# Patient Record
Sex: MALE | Race: WHITE | Employment: FULL TIME | URBAN - NONMETROPOLITAN AREA
[De-identification: names, ages, dates, MRNs, and addresses within clinical notes are randomized per-mention and may not be internally consistent; named-entity substitution may affect disease eponyms.]

---

## 2021-10-13 LAB — SARS-COV-2: DETECTED

## 2021-10-14 ENCOUNTER — HOSPITAL ENCOUNTER (EMERGENCY)
Age: 61
Discharge: HOME | End: 2021-10-14
Payer: COMMERCIAL

## 2021-10-14 ENCOUNTER — APPOINTMENT (OUTPATIENT)
Dept: CT IMAGING | Age: 61
End: 2021-10-14
Payer: COMMERCIAL

## 2021-10-14 VITALS
SYSTOLIC BLOOD PRESSURE: 109 MMHG | WEIGHT: 175 LBS | RESPIRATION RATE: 16 BRPM | OXYGEN SATURATION: 99 % | HEART RATE: 80 BPM | TEMPERATURE: 100.6 F | DIASTOLIC BLOOD PRESSURE: 89 MMHG

## 2021-10-14 DIAGNOSIS — J12.82 PNEUMONIA DUE TO COVID-19 VIRUS: Primary | ICD-10-CM

## 2021-10-14 DIAGNOSIS — U07.1 PNEUMONIA DUE TO COVID-19 VIRUS: Primary | ICD-10-CM

## 2021-10-14 DIAGNOSIS — R77.8 ELEVATED TROPONIN: ICD-10-CM

## 2021-10-14 DIAGNOSIS — R59.1 LYMPHADENOPATHY: ICD-10-CM

## 2021-10-14 LAB
ANION GAP SERPL CALCULATED.3IONS-SCNC: 10 MEQ/L (ref 8–16)
BASOPHILS # BLD: 0.2 %
BASOPHILS ABSOLUTE: 0 THOU/MM3 (ref 0–0.1)
BUN BLDV-MCNC: 21 MG/DL (ref 7–22)
C-REACTIVE PROTEIN: 6.9 MG/DL (ref 0–1)
CALCIUM SERPL-MCNC: 8.7 MG/DL (ref 8.5–10.5)
CHLORIDE BLD-SCNC: 98 MEQ/L (ref 98–111)
CO2: 25 MEQ/L (ref 23–33)
CREAT SERPL-MCNC: 1.3 MG/DL (ref 0.4–1.2)
EKG ATRIAL RATE: 84 BPM
EKG P AXIS: 65 DEGREES
EKG P-R INTERVAL: 156 MS
EKG Q-T INTERVAL: 376 MS
EKG QRS DURATION: 114 MS
EKG QTC CALCULATION (BAZETT): 444 MS
EKG R AXIS: 61 DEGREES
EKG T AXIS: 61 DEGREES
EKG VENTRICULAR RATE: 84 BPM
EOSINOPHIL # BLD: 0 %
EOSINOPHILS ABSOLUTE: 0 THOU/MM3 (ref 0–0.4)
ERYTHROCYTE [DISTWIDTH] IN BLOOD BY AUTOMATED COUNT: 12.6 % (ref 11.5–14.5)
ERYTHROCYTE [DISTWIDTH] IN BLOOD BY AUTOMATED COUNT: 44.7 FL (ref 35–45)
GFR SERPL CREATININE-BSD FRML MDRD: 56 ML/MIN/1.73M2
GLUCOSE BLD-MCNC: 97 MG/DL (ref 70–108)
HCT VFR BLD CALC: 45 % (ref 42–52)
HEMOGLOBIN: 14.9 GM/DL (ref 14–18)
IMMATURE GRANS (ABS): 0.02 THOU/MM3 (ref 0–0.07)
IMMATURE GRANULOCYTES: 0.5 %
LACTIC ACID, SEPSIS: < 0.2 MMOL/L (ref 0.5–1.9)
LD: 216 U/L (ref 100–190)
LYMPHOCYTES # BLD: 7.1 %
LYMPHOCYTES ABSOLUTE: 0.3 THOU/MM3 (ref 1–4.8)
MAGNESIUM: 2 MG/DL (ref 1.6–2.4)
MCH RBC QN AUTO: 31.7 PG (ref 26–33)
MCHC RBC AUTO-ENTMCNC: 33.1 GM/DL (ref 32.2–35.5)
MCV RBC AUTO: 95.7 FL (ref 80–94)
MONOCYTES # BLD: 12.4 %
MONOCYTES ABSOLUTE: 0.5 THOU/MM3 (ref 0.4–1.3)
NUCLEATED RED BLOOD CELLS: 0 /100 WBC
OSMOLALITY CALCULATION: 269.3 MOSMOL/KG (ref 275–300)
PLATELET # BLD: 139 THOU/MM3 (ref 130–400)
PMV BLD AUTO: 9.1 FL (ref 9.4–12.4)
POTASSIUM SERPL-SCNC: 4.4 MEQ/L (ref 3.5–5.2)
PRO-BNP: 82.2 PG/ML (ref 0–900)
RBC # BLD: 4.7 MILL/MM3 (ref 4.7–6.1)
SEG NEUTROPHILS: 79.8 %
SEGMENTED NEUTROPHILS ABSOLUTE COUNT: 3.3 THOU/MM3 (ref 1.8–7.7)
SODIUM BLD-SCNC: 133 MEQ/L (ref 135–145)
TRANSFERRIN: 175 MG/DL (ref 200–360)
TROPONIN T: 0.01 NG/ML
TROPONIN T: 0.02 NG/ML
WBC # BLD: 4.1 THOU/MM3 (ref 4.8–10.8)

## 2021-10-14 PROCEDURE — 84484 ASSAY OF TROPONIN QUANT: CPT

## 2021-10-14 PROCEDURE — 83735 ASSAY OF MAGNESIUM: CPT

## 2021-10-14 PROCEDURE — 93010 ELECTROCARDIOGRAM REPORT: CPT | Performed by: INTERNAL MEDICINE

## 2021-10-14 PROCEDURE — 71275 CT ANGIOGRAPHY CHEST: CPT

## 2021-10-14 PROCEDURE — 83615 LACTATE (LD) (LDH) ENZYME: CPT

## 2021-10-14 PROCEDURE — 85025 COMPLETE CBC W/AUTO DIFF WBC: CPT

## 2021-10-14 PROCEDURE — 93005 ELECTROCARDIOGRAM TRACING: CPT | Performed by: PHYSICIAN ASSISTANT

## 2021-10-14 PROCEDURE — 36415 COLL VENOUS BLD VENIPUNCTURE: CPT

## 2021-10-14 PROCEDURE — 86140 C-REACTIVE PROTEIN: CPT

## 2021-10-14 PROCEDURE — 84466 ASSAY OF TRANSFERRIN: CPT

## 2021-10-14 PROCEDURE — 6360000004 HC RX CONTRAST MEDICATION: Performed by: PHYSICIAN ASSISTANT

## 2021-10-14 PROCEDURE — 83605 ASSAY OF LACTIC ACID: CPT

## 2021-10-14 PROCEDURE — 83880 ASSAY OF NATRIURETIC PEPTIDE: CPT

## 2021-10-14 PROCEDURE — 80048 BASIC METABOLIC PNL TOTAL CA: CPT

## 2021-10-14 PROCEDURE — 99285 EMERGENCY DEPT VISIT HI MDM: CPT

## 2021-10-14 RX ORDER — GABAPENTIN 600 MG/1
600 TABLET ORAL 2 TIMES DAILY
COMMUNITY

## 2021-10-14 RX ADMIN — IOPAMIDOL 80 ML: 755 INJECTION, SOLUTION INTRAVENOUS at 13:04

## 2021-10-14 ASSESSMENT — ENCOUNTER SYMPTOMS
RHINORRHEA: 1
SINUS PRESSURE: 0
CONSTIPATION: 0
COUGH: 1
BACK PAIN: 0
CHEST TIGHTNESS: 1
EYE REDNESS: 0
TROUBLE SWALLOWING: 0
SINUS PAIN: 0
SORE THROAT: 0
EYE ITCHING: 0
ABDOMINAL PAIN: 0
VOMITING: 0
COLOR CHANGE: 0
EYE PAIN: 0
SHORTNESS OF BREATH: 0
WHEEZING: 0
NAUSEA: 0
DIARRHEA: 0

## 2021-10-14 ASSESSMENT — PAIN SCALES - GENERAL: PAINLEVEL_OUTOF10: 3

## 2021-10-14 ASSESSMENT — PAIN DESCRIPTION - DESCRIPTORS: DESCRIPTORS: SORE

## 2021-10-14 ASSESSMENT — PAIN DESCRIPTION - FREQUENCY: FREQUENCY: CONTINUOUS

## 2021-10-14 ASSESSMENT — PAIN DESCRIPTION - LOCATION: LOCATION: CHEST

## 2021-10-14 ASSESSMENT — PAIN DESCRIPTION - PAIN TYPE: TYPE: ACUTE PAIN

## 2021-10-14 NOTE — ED PROVIDER NOTES
back pain, myalgias and neck pain. Skin: Negative for color change, pallor, rash and wound. Allergic/Immunologic: Negative for environmental allergies, food allergies and immunocompromised state. Neurological: Negative for dizziness, weakness, light-headedness, numbness and headaches. Hematological: Negative for adenopathy. Does not bruise/bleed easily. Psychiatric/Behavioral: Negative for agitation, behavioral problems, decreased concentration and dysphoric mood. The patient is not nervous/anxious. PAST MEDICAL HISTORY    has a past medical history of Nerve damage. SURGICAL HISTORY      has a past surgical history that includes hernia repair. CURRENT MEDICATIONS       Discharge Medication List as of 10/14/2021  4:40 PM      CONTINUE these medications which have NOT CHANGED    Details   GABAPENTIN PO Take by mouthHistorical Med             ALLERGIES     has No Known Allergies. HISTORY     has no family status information on file. family history is not on file. SOCIALHISTORY      reports that he has never smoked. His smokeless tobacco use includes chew. He reports current alcohol use. He reports that he does not use drugs. PHYSICAL EXAM     INITIAL VITALS:  weight is 175 lb (79.4 kg). His oral temperature is 100.6 °F (38.1 °C). His blood pressure is 109/89 and his pulse is 80. His respiration is 16 and oxygen saturation is 99%. Physical Exam  Constitutional:       General: He is not in acute distress. Appearance: Normal appearance. He is normal weight. He is not ill-appearing, toxic-appearing or diaphoretic. HENT:      Head: Normocephalic and atraumatic. Nose: Nose normal. No congestion or rhinorrhea. Mouth/Throat:      Mouth: Mucous membranes are moist.   Eyes:      General: No scleral icterus. Right eye: No discharge. Left eye: No discharge. Extraocular Movements: Extraocular movements intact.       Conjunctiva/sclera: Conjunctivae normal. 10/14/21 10:52:41 10/14/21 11:02:11 444 65 61 61         Preliminary result                Narrative:    Normal sinus rhythm   Possible Left atrial enlargement   Incomplete right bundle branch block   Borderline ECG   No previous ECGs available                Preliminary result                Narrative:    Normal sinus rhythm   Possible Left atrial enlargement   Incomplete right bundle branch block   Borderline ECG   No previous ECGs available                      RADIOLOGY: non-plain film images(s) such as CT, Ultrasound and MRI are read by the radiologist.  CT results per radiology reads      CTA CHEST W WO CONTRAST (Final result)  Result time 10/14/21 13:21:49  Final result by Nayeli Malone MD (10/14/21 13:21:49)                Impression:    1. No filling defects are noted within the pulmonary arterial vasculature to suggest the presence of pulmonary embolism as clinically questioned. 2. Patchy multifocal bilateral lower lobe airspace opacities are consistent with the clinical history of covid pneumonia. A prominent right hilar lymph node measuring 13 mm in short axis may be reactive. A 3 month follow-up CT thorax may be obtained at   the completion of medical treatment to ensure resolution. **This report has been created using voice recognition software.  It may contain minor errors which are inherent in voice recognition technology. **     Final report electronically signed by Dr Nayeli Malone on 10/14/2021 1:21 PM            Narrative:    PROCEDURE: CTA CHEST W WO CONTRAST     CLINICAL INFORMATION: Chest pain. History of covid pneumonia. COMPARISON: No prior study. TECHNIQUE:   1.5 mm axial images were obtained through the chest after the administration of IV contrast. A non-contrast localizer was obtained. 2mm MIP reconstructions of the chest performed in coronal and sagittal planes.      All CT scans at this facility use dose modulation, iterative reconstruction, and/or weight based components:     (*)     All other components within normal limits   GLOMERULAR FILTRATION RATE, ESTIMATED - Abnormal; Notable for the following components:    Est, Glom Filt Rate 56 (*)     All other components within normal limits   OSMOLALITY - Abnormal; Notable for the following components:    Osmolality Calc 269.3 (*)     All other components within normal limits   TROPONIN - Abnormal; Notable for the following components:    Troponin T 0.011 (*)     All other components within normal limits   BRAIN NATRIURETIC PEPTIDE   MAGNESIUM   ANION GAP   TRANSFERRIN       EMERGENCY DEPARTMENT COURSE:   :    Vitals:    10/14/21 1244 10/14/21 1351 10/14/21 1510 10/14/21 1634   BP:  109/89     Pulse:  80 80 80   Resp:  13 14 16   Temp:       TempSrc:       SpO2: 94% 97% 97% 99%   Weight:         Patient was seen history physical exam was performed. I did explain the patient that he did have some enlarged lymph nodes likely secondary to inflammation from his Covid however it was recommended this for repeated in 3 months. I also did talk about his elevated troponin that did trend down. This could be from dehydration however my attending physician felt an outpatient cardiology referral was warranted. Case was staffed with Dr Narcisa Santos. see disposition below    CRITICAL CARE:  None    CONSULTS:  None    PROCEDURES:  None    FINAL IMPRESSION      1. Pneumonia due to COVID-19 virus    2. Elevated troponin    3.  Lymphadenopathy          DISPOSITION/PLAN   Discharge    PATIENT REFERRED TO:  Danya Coello MD  [address] [place] [address]  [phone]          [facility] [address] [place] [address]  [phone]  In 2 days        DISCHARGE MEDICATIONS:  Discharge Medication List as of 10/14/2021  4:40 PM          (Please note that portions of this note were completed with a voice recognitionprogram.  Efforts were made to edit the dictations but occasionally words are mis-transcribed.)    NANI [place], [place]  10/14/21 1912

## 2021-10-14 NOTE — ED TRIAGE NOTES
Patient presents to the ED with concerns of low pulse ox at home. Patient states he recently tested positive for covid. He was advised by his PCP to check his pulse ox a few times a day and if results were less than 95%, to come in to be evaluated. He states he was sitting at home drinking his coffee and found his pulse ox to be down to 90%. Patient denies sob. He has chest tightness with breathing that he rates as a soreness of 3/10. VSS. Current pulse ox at 97-99%.

## 2021-10-15 ENCOUNTER — CARE COORDINATION (OUTPATIENT)
Dept: CARE COORDINATION | Age: 61
End: 2021-10-15

## 2021-10-15 NOTE — CARE COORDINATION
Patient contacted regarding recent visit for viral symptoms. Call within 2 business days of discharge: Yes     contacted the patient by telephone to perform follow-up call. Verified name and  with patient as identifiers. Provided introduction to self, and reason for call due to viral symptoms of infection and/or exposure to COVID-19. Discussed COVID-19 related testing which was available at this time. Test results were positive. Patient informed of results, if available? Yes. Patient presented to emergency department/flu clinic with complaints of viral symptoms/exposure to COVID. Patient reports symptoms are improving. Due to no new or worsening symptoms the RN CTN/ANIRUDH was not notified for escalation. This author reviewed discharge instructions, medical action plan and red flags such as increased shortness of breath, increasing fever, worsening cough or chest pain with patient who verbalized understanding. Discussed exposure protocols and quarantine with CDC Guidelines What To Do If You Are Sick    Patient was given an opportunity for questions and concerns. The patient agrees to contact their healthcare provider for questions related to their healthcare. Author provided contact information for future reference. Patient was seen in the ER on 10/14/21 he is positive for covid and went in because his pulse ox was 95. He was told by his PCP if it is 95 or below to go get checked out. I educated the patient on covid precautions and gave him the covid hotline number. Patient states his breathing is normal today and he is feeling good. Patient aware to call the hotline or his pcp with new or worsening symptoms.     [facility]   Medication Assistance  [facility], and [place]    Z) [phone] (v) [phone]

## 2021-10-23 ENCOUNTER — HOSPITAL ENCOUNTER (INPATIENT)
Age: 61
LOS: 2 days | Discharge: HOME | DRG: 177 | End: 2021-10-25
Attending: EMERGENCY MEDICINE | Admitting: INTERNAL MEDICINE
Payer: COMMERCIAL

## 2021-10-23 ENCOUNTER — APPOINTMENT (OUTPATIENT)
Dept: GENERAL RADIOLOGY | Age: 61
DRG: 177 | End: 2021-10-23
Payer: COMMERCIAL

## 2021-10-23 DIAGNOSIS — E87.5 HYPERKALEMIA: ICD-10-CM

## 2021-10-23 DIAGNOSIS — U07.1 COVID: Primary | ICD-10-CM

## 2021-10-23 DIAGNOSIS — R09.02 HYPOXIA: ICD-10-CM

## 2021-10-23 PROBLEM — J96.01 ACUTE HYPOXEMIC RESPIRATORY FAILURE DUE TO COVID-19 (HCC): Status: ACTIVE | Noted: 2021-10-23

## 2021-10-23 LAB
ALBUMIN SERPL-MCNC: 3.1 G/DL (ref 3.5–5.1)
ALP BLD-CCNC: 122 U/L (ref 38–126)
ALT SERPL-CCNC: 17 U/L (ref 11–66)
ANION GAP SERPL CALCULATED.3IONS-SCNC: 10 MEQ/L (ref 8–16)
AST SERPL-CCNC: 23 U/L (ref 5–40)
BASOPHILS # BLD: 0.5 %
BASOPHILS ABSOLUTE: 0 THOU/MM3 (ref 0–0.1)
BILIRUB SERPL-MCNC: 0.8 MG/DL (ref 0.3–1.2)
BUN BLDV-MCNC: 17 MG/DL (ref 7–22)
C-REACTIVE PROTEIN: 16.3 MG/DL (ref 0–1)
CALCIUM SERPL-MCNC: 8.9 MG/DL (ref 8.5–10.5)
CHLORIDE BLD-SCNC: 96 MEQ/L (ref 98–111)
CO2: 25 MEQ/L (ref 23–33)
CREAT SERPL-MCNC: 1 MG/DL (ref 0.4–1.2)
EOSINOPHIL # BLD: 0.2 %
EOSINOPHILS ABSOLUTE: 0 THOU/MM3 (ref 0–0.4)
ERYTHROCYTE [DISTWIDTH] IN BLOOD BY AUTOMATED COUNT: 12.8 % (ref 11.5–14.5)
ERYTHROCYTE [DISTWIDTH] IN BLOOD BY AUTOMATED COUNT: 43.9 FL (ref 35–45)
FERRITIN: 3201 NG/ML (ref 22–322)
GFR SERPL CREATININE-BSD FRML MDRD: 75 ML/MIN/1.73M2
GLUCOSE BLD-MCNC: 108 MG/DL (ref 70–108)
HCT VFR BLD CALC: 44.1 % (ref 42–52)
HEMOGLOBIN: 14.8 GM/DL (ref 14–18)
IMMATURE GRANS (ABS): 0.3 THOU/MM3 (ref 0–0.07)
IMMATURE GRANULOCYTES: 4.7 %
LACTIC ACID: 1.8 MMOL/L (ref 0.5–2)
LD: 336 U/L (ref 100–190)
LYMPHOCYTES # BLD: 5 %
LYMPHOCYTES ABSOLUTE: 0.3 THOU/MM3 (ref 1–4.8)
MCH RBC QN AUTO: 31.6 PG (ref 26–33)
MCHC RBC AUTO-ENTMCNC: 33.6 GM/DL (ref 32.2–35.5)
MCV RBC AUTO: 94 FL (ref 80–94)
MONOCYTES # BLD: 5.9 %
MONOCYTES ABSOLUTE: 0.4 THOU/MM3 (ref 0.4–1.3)
NUCLEATED RED BLOOD CELLS: 0 /100 WBC
OSMOLALITY CALCULATION: 264.7 MOSMOL/KG (ref 275–300)
PLATELET # BLD: 313 THOU/MM3 (ref 130–400)
PMV BLD AUTO: 9.2 FL (ref 9.4–12.4)
POTASSIUM REFLEX MAGNESIUM: 4.2 MEQ/L (ref 3.5–5.2)
RBC # BLD: 4.69 MILL/MM3 (ref 4.7–6.1)
SEG NEUTROPHILS: 83.7 %
SEGMENTED NEUTROPHILS ABSOLUTE COUNT: 5.4 THOU/MM3 (ref 1.8–7.7)
SODIUM BLD-SCNC: 131 MEQ/L (ref 135–145)
TOTAL PROTEIN: 6.9 G/DL (ref 6.1–8)
TROPONIN T: 0.01 NG/ML
WBC # BLD: 6.4 THOU/MM3 (ref 4.8–10.8)

## 2021-10-23 PROCEDURE — 80053 COMPREHEN METABOLIC PANEL: CPT

## 2021-10-23 PROCEDURE — 6370000000 HC RX 637 (ALT 250 FOR IP): Performed by: INTERNAL MEDICINE

## 2021-10-23 PROCEDURE — 2580000003 HC RX 258: Performed by: INTERNAL MEDICINE

## 2021-10-23 PROCEDURE — 93005 ELECTROCARDIOGRAM TRACING: CPT | Performed by: EMERGENCY MEDICINE

## 2021-10-23 PROCEDURE — 82728 ASSAY OF FERRITIN: CPT

## 2021-10-23 PROCEDURE — 99223 1ST HOSP IP/OBS HIGH 75: CPT | Performed by: INTERNAL MEDICINE

## 2021-10-23 PROCEDURE — 6360000002 HC RX W HCPCS: Performed by: EMERGENCY MEDICINE

## 2021-10-23 PROCEDURE — 86140 C-REACTIVE PROTEIN: CPT

## 2021-10-23 PROCEDURE — 83605 ASSAY OF LACTIC ACID: CPT

## 2021-10-23 PROCEDURE — 36415 COLL VENOUS BLD VENIPUNCTURE: CPT

## 2021-10-23 PROCEDURE — 99284 EMERGENCY DEPT VISIT MOD MDM: CPT

## 2021-10-23 PROCEDURE — 6360000002 HC RX W HCPCS: Performed by: INTERNAL MEDICINE

## 2021-10-23 PROCEDURE — 83615 LACTATE (LD) (LDH) ENZYME: CPT

## 2021-10-23 PROCEDURE — 84484 ASSAY OF TROPONIN QUANT: CPT

## 2021-10-23 PROCEDURE — 71045 X-RAY EXAM CHEST 1 VIEW: CPT

## 2021-10-23 PROCEDURE — 96374 THER/PROPH/DIAG INJ IV PUSH: CPT

## 2021-10-23 PROCEDURE — 85025 COMPLETE CBC W/AUTO DIFF WBC: CPT

## 2021-10-23 PROCEDURE — 1200000000 HC SEMI PRIVATE

## 2021-10-23 PROCEDURE — XW0DXM6 INTRODUCTION OF BARICITINIB INTO MOUTH AND PHARYNX, EXTERNAL APPROACH, NEW TECHNOLOGY GROUP 6: ICD-10-PCS | Performed by: INTERNAL MEDICINE

## 2021-10-23 RX ORDER — SODIUM CHLORIDE 9 MG/ML
25 INJECTION, SOLUTION INTRAVENOUS PRN
Status: DISCONTINUED | OUTPATIENT
Start: 2021-10-23 | End: 2021-10-25 | Stop reason: HOSPADM

## 2021-10-23 RX ORDER — POLYETHYLENE GLYCOL 3350 17 G/17G
17 POWDER, FOR SOLUTION ORAL DAILY PRN
Status: DISCONTINUED | OUTPATIENT
Start: 2021-10-23 | End: 2021-10-25 | Stop reason: HOSPADM

## 2021-10-23 RX ORDER — ZINC SULFATE 50(220)MG
50 CAPSULE ORAL DAILY
Status: DISCONTINUED | OUTPATIENT
Start: 2021-10-23 | End: 2021-10-25 | Stop reason: HOSPADM

## 2021-10-23 RX ORDER — GUAIFENESIN/DEXTROMETHORPHAN 100-10MG/5
5 SYRUP ORAL EVERY 4 HOURS PRN
Status: DISCONTINUED | OUTPATIENT
Start: 2021-10-23 | End: 2021-10-25 | Stop reason: HOSPADM

## 2021-10-23 RX ORDER — ASCORBIC ACID 500 MG
1000 TABLET ORAL DAILY
Status: DISCONTINUED | OUTPATIENT
Start: 2021-10-23 | End: 2021-10-25 | Stop reason: HOSPADM

## 2021-10-23 RX ORDER — DEXAMETHASONE SODIUM PHOSPHATE 4 MG/ML
6 INJECTION, SOLUTION INTRA-ARTICULAR; INTRALESIONAL; INTRAMUSCULAR; INTRAVENOUS; SOFT TISSUE EVERY 24 HOURS
Status: DISCONTINUED | OUTPATIENT
Start: 2021-10-23 | End: 2021-10-25 | Stop reason: HOSPADM

## 2021-10-23 RX ORDER — SODIUM CHLORIDE 9 MG/ML
INJECTION, SOLUTION INTRAVENOUS CONTINUOUS
Status: ACTIVE | OUTPATIENT
Start: 2021-10-23 | End: 2021-10-24

## 2021-10-23 RX ORDER — ACETAMINOPHEN 650 MG/1
650 SUPPOSITORY RECTAL EVERY 6 HOURS PRN
Status: DISCONTINUED | OUTPATIENT
Start: 2021-10-23 | End: 2021-10-25 | Stop reason: HOSPADM

## 2021-10-23 RX ORDER — SODIUM CHLORIDE 0.9 % (FLUSH) 0.9 %
5-40 SYRINGE (ML) INJECTION EVERY 12 HOURS SCHEDULED
Status: DISCONTINUED | OUTPATIENT
Start: 2021-10-23 | End: 2021-10-25 | Stop reason: HOSPADM

## 2021-10-23 RX ORDER — ONDANSETRON 2 MG/ML
4 INJECTION INTRAMUSCULAR; INTRAVENOUS EVERY 6 HOURS PRN
Status: DISCONTINUED | OUTPATIENT
Start: 2021-10-23 | End: 2021-10-25 | Stop reason: HOSPADM

## 2021-10-23 RX ORDER — VITAMIN B COMPLEX
2000 TABLET ORAL DAILY
Status: DISCONTINUED | OUTPATIENT
Start: 2021-10-23 | End: 2021-10-25 | Stop reason: HOSPADM

## 2021-10-23 RX ORDER — GABAPENTIN 600 MG/1
600 TABLET ORAL 3 TIMES DAILY
Status: DISCONTINUED | OUTPATIENT
Start: 2021-10-23 | End: 2021-10-25 | Stop reason: HOSPADM

## 2021-10-23 RX ORDER — ACETAMINOPHEN 325 MG/1
650 TABLET ORAL EVERY 6 HOURS PRN
Status: DISCONTINUED | OUTPATIENT
Start: 2021-10-23 | End: 2021-10-25 | Stop reason: HOSPADM

## 2021-10-23 RX ORDER — ONDANSETRON 4 MG/1
4 TABLET, ORALLY DISINTEGRATING ORAL EVERY 8 HOURS PRN
Status: DISCONTINUED | OUTPATIENT
Start: 2021-10-23 | End: 2021-10-25 | Stop reason: HOSPADM

## 2021-10-23 RX ORDER — SODIUM CHLORIDE 0.9 % (FLUSH) 0.9 %
5-40 SYRINGE (ML) INJECTION PRN
Status: DISCONTINUED | OUTPATIENT
Start: 2021-10-23 | End: 2021-10-25 | Stop reason: HOSPADM

## 2021-10-23 RX ADMIN — Medication 50 MG: at 20:04

## 2021-10-23 RX ADMIN — SODIUM CHLORIDE, PRESERVATIVE FREE 10 ML: 5 INJECTION INTRAVENOUS at 13:56

## 2021-10-23 RX ADMIN — OXYCODONE HYDROCHLORIDE AND ACETAMINOPHEN 1000 MG: 500 TABLET ORAL at 20:04

## 2021-10-23 RX ADMIN — GABAPENTIN 600 MG: 600 TABLET, FILM COATED ORAL at 13:54

## 2021-10-23 RX ADMIN — Medication 2000 UNITS: at 13:54

## 2021-10-23 RX ADMIN — BARICITINIB 4 MG: 2 TABLET, FILM COATED ORAL at 13:54

## 2021-10-23 RX ADMIN — GABAPENTIN 600 MG: 600 TABLET, FILM COATED ORAL at 20:06

## 2021-10-23 RX ADMIN — DEXAMETHASONE SODIUM PHOSPHATE 6 MG: 4 INJECTION, SOLUTION INTRA-ARTICULAR; INTRALESIONAL; INTRAMUSCULAR; INTRAVENOUS; SOFT TISSUE at 09:24

## 2021-10-23 RX ADMIN — SODIUM CHLORIDE: 9 INJECTION, SOLUTION INTRAVENOUS at 18:49

## 2021-10-23 RX ADMIN — ENOXAPARIN SODIUM 30 MG: 100 INJECTION SUBCUTANEOUS at 20:04

## 2021-10-23 RX ADMIN — ENOXAPARIN SODIUM 30 MG: 100 INJECTION SUBCUTANEOUS at 13:54

## 2021-10-23 ASSESSMENT — PAIN DESCRIPTION - PAIN TYPE: TYPE: ACUTE PAIN

## 2021-10-23 ASSESSMENT — ENCOUNTER SYMPTOMS
BACK PAIN: 0
EYE PAIN: 0
CHEST TIGHTNESS: 0
SINUS PRESSURE: 0
CONSTIPATION: 0
COUGH: 0
SINUS PAIN: 0
SHORTNESS OF BREATH: 1
DIARRHEA: 0
NAUSEA: 0
ABDOMINAL PAIN: 0

## 2021-10-23 ASSESSMENT — PAIN SCALES - GENERAL
PAINLEVEL_OUTOF10: 0
PAINLEVEL_OUTOF10: 0

## 2021-10-23 ASSESSMENT — PAIN DESCRIPTION - DESCRIPTORS: DESCRIPTORS: TIGHTNESS

## 2021-10-23 ASSESSMENT — PAIN DESCRIPTION - LOCATION: LOCATION: CHEST

## 2021-10-23 NOTE — H&P
Hospitalist H&P    Patient:  Melissa Castellanos    MRN: [id number]    Unit/Bed:6A-05/005-A    Acct: [de-identified]    YOB: 1960    PCP: Mario Hopper MD    Date of Admission: 10/23/2021      Assessment and Plan:          1. Acute hypoxic respiratory failure due to COVID-19 PNA: Will start of decadron and baricitinib, Vitamin D, Zinc, Vitamin Cbid lovenox 30mg and prn robitussin; wean O2 as tolerated  2. Neuropathic pain: c/w home gabapentin    CC:  SOB    HPI: 60 yo male presents to the ED with complaint of SOB and low oxygen saturation. Diagnosed with COVID-19 3 days PTA, has been getting worse since that time. Noted saturation to be in 90s at rest but as low as 50s with activity. When seen he cites generally feeling well except when trying to ambulate. ROS (Review of systems completed. Pertinent positives noted. Otherwise ROS is negative)    PMH:  Per HPI and       Diagnosis Date    Nerve damage      SHX:        Procedure Laterality Date    HERNIA REPAIR      about 15 years ago     FHX: History reviewed. No pertinent family history. Allergies: Patient has no known allergies. Medications:     sodium chloride        dexamethasone  6 mg IntraVENous Q24H    gabapentin  600 mg Oral TID    sodium chloride flush  5-40 mL IntraVENous 2 times per day    enoxaparin  30 mg SubCUTAneous BID    Vitamin D  2,000 Units Oral Daily    baricitinib  4 mg Oral Daily       Vital Signs:   /73   Pulse 74   Temp 98.4 °F (36.9 °C) (Oral)   Resp 22   Ht 6' (1.829 m)   Wt 175 lb (79.4 kg)   SpO2 96%   BMI 23.73 kg/m²      Intake/Output Summary (Last 24 hours) at 10/23/2021 1710  Last data filed at 10/23/2021 1500  Gross per 24 hour   Intake --   Output 200 ml   Net -200 ml        General:   Appears acutely ill, appears stated age and cooperative. HEENT:  normocephalic and atraumatic. No scleral icterus. PERR. EOMI  Neck: supple. No JVD. No thyromegaly.   Lungs: B/l crackles  Normal respiratory effort. No retractions  Cardiac: RRR without murmur. Abdomen: soft. Nontender. Bowel sounds positive. Extremities:  No clubbing, cyanosis, or edema x 4. Vasculature: capillary refill < 3 seconds. Palpable LE pulses bilaterally. Skin:  Skin color, texture, turgor normal; warm and dry. Psych:  Alert and oriented x3. Affect/thought content appropriate  Lymph:  No palpable adenopathy   Neurologic:  Neurovascularly intact without any focal sensory/motor deficits. Cranial nerves: II-XII intact, grossly non-focal.    Data: (All radiographs, tracings, PFTs, and imaging are personally viewed and interpreted unless otherwise noted).     Ferritin 3201, CRP 16.3   CXR shows b/l infiltrates      Electronically signed by Jeanette Bunch DO on 10/23/2021 at 5:10 PM

## 2021-10-23 NOTE — ED NOTES
ED to inpatient nurses report    Chief Complaint   Patient presents with    Positive For Covid-19    Shortness of Breath      Present to ED from home  LOC: alert and orientated to name, place, date  Vital signs   Vitals:    10/23/21 0856 10/23/21 0902 10/23/21 0959 10/23/21 1058   BP:  126/83 112/73 118/84   Pulse: 85  81 83   Resp: 16  26 28   Temp:  98.8 °F (37.1 °C)     TempSrc:  Oral     SpO2: (!) 87% 99% 100% 98%   Weight: 175 lb (79.4 kg)      Height: 6' (1.829 m)         Oxygen Baseline RA    Current needs required 4L via NC Bipap/Cpap No  LDAs:   Peripheral IV 10/23/21 Left Antecubital (Active)   Site Assessment Clean;Dry; Intact 10/23/21 0924   Line Status Blood return noted; Flushed; Infusing 10/23/21 0924   Dressing Status Dry; Intact; Clean 10/23/21 0924   Dressing Intervention New 10/23/21 0924     Mobility: Independent  Pending ED orders: none  Present condition: stable    Our promise was given to patient    Electronically signed by Jacy Altamirano RN on 10/23/2021 at 11:37 AM       Jacy Altamirano RN  10/23/21 6660

## 2021-10-23 NOTE — ED NOTES
Pt updated on POC. Admission process and new room assignment. 6A05. Vitals updated. Box lunch provided. And call light remains within reach.       Malcom Martinez RN  10/23/21 1127

## 2021-10-23 NOTE — ED TRIAGE NOTES
PT in bed. PT stated that he was diagnosed 10/13/21 with COVID. PT stated that he has a portable pulse ox. PT stated that his O2 sat was in the high 80s today. PT stated that he called his PCP and they told him to come to the ER. PT in no distress.

## 2021-10-23 NOTE — ED PROVIDER NOTES
**This is a Medical/PA/APRN Student Note and is charted for educational purposes. The non-physician staff attested note is not to be used for billing purposes, chart documentation or to guide patient care. Please see the supervising physician/PA/APRN modifications/attestation for treatment plan/chart documentation/suggestions. This note has been reviewed and feedback has been provided to the student. **      MEDICAL STUDENT NOTE    Chief Complaint   Patient presents with    Positive For Covid-19    Shortness of Breath     History obtained from the patient. ELSI Eden is a 59 y.o. male who presents to the ED c/o of \"decreased pulse ox readings\". He states he was diagnosed with COVID-19 approximately 1 week ago and was given a pulse ox monitor. During this time, he states after returning from any physical exertion he would note a decrease in his readings to the 50's-60's, but denied any shortness of breath. The patient denies any active shortness of breath in the ED on this visit, but complains that for the past has had episodes of diarrhea during the week. The patient denies any fevers, chills, headaches, dizziness, LOC, recent trauma, N/V, abdominal pain, dysuria, hematuria, weakness, numbness, or tingling. Review of Systems  GENERAL: Denies any vomiting, fevers, chills, or weight loss. NEUROLOGIC: Denies any blurry vision, blind spots, double vision, facial asymmetry, dysphagia, dysarthria, hemiparesis, hemisensory deficits  HEENT: Denies any head trauma, neck trauma, neck stiffness, photophobia, phonophobia, sinusitis, rhinitis. CARDIAC: Denies any chest pain, dyspnea on exertion. PULMONARY: Denies any shortness of breath, wheezing  GASTROINTESTINAL:  Complains of diarrhea Denies any abdominal pain, bright red blood per rectum, melena. GENITOURINARY: Denies any dysuria, hematuria, incontinence.         Past Medical History:   Diagnosis Date    Nerve damage        Past Surgical History: Procedure Laterality Date    HERNIA REPAIR      about 15 years ago       Current Facility-Administered Medications:     dexamethasone (DECADRON) injection 6 mg, 6 mg, IntraVENous, Q24H, Linda Mcgovern MD, 6 mg at 10/23/21 0060    Current Outpatient Medications:     GABAPENTIN PO, Take by mouth, Disp: , Rfl:     Social History     Social History Narrative    Not on file     Social History     Tobacco Use    Smoking status: Never Smoker    Smokeless tobacco: Current User     Types: Chew   Substance Use Topics    Alcohol use: Yes     Comment: very little    Drug use: Never       No Known Allergies    History reviewed. No pertinent family history. Previous records reviewed: The patient was seen on 10/14 for similar symptoms after being diagnosed by his PCP with COVID-19. The patient was evaluated in the ED and had an elevated troponin that was downtrending and negative CTA and was given strict return precautions. Vitals:    10/23/21 0959   BP: 112/73   Pulse: 81   Resp: 26   Temp:    SpO2: 100%     Vital signs and nursing assessment reviewed and abnormal from pulse ox saturation from admission at 87% on room air. Pulsoximetry is normal during physical exam at 100% on 4L O2 through nasal cannula per my interpretation. Physical Exam  General Impression: Nontoxic, Alert and oriented x3  Head: Normocephalic atraumatic  Neck: Symmetric, trachea midline, No thyromegaly   EENT: No conjunctival injection , and symmetrical lids, moist mucus membranes   Cardiovascular: Heart RRR, no murmurs, rubs or gallops. Chest: Unlabored respiratory effort, mildly decreased bilateral lung base breath sounds, no rhonchi, no wheeze, no rales  Abdomen: Bowel sounds present, abdomen soft, nontender, non-distended, no organomegaly  Musculoskeletal: Pulses present and equal in all extremities, no peripheral edema.    Motor: Power 5/5 bilaterally, no focal deficits noted  Neurological: CN II-XII grossly intact, sensation preserved throughout body, no focal motor deficits noted  Skin: Warm, Dry, Intact with no visualized rashes  Psych: Normal affect and mood          MDM  Initial Assessment: Acute hypoxia secondary to confirmed COVID-19 infection    The patient presented to the ED nontoxic appearing following a few days of decreased pulse ox readings with no active shortness of breath. At intake the patient was saturating at 87% on room air, and was started on 4L/min O2 via nasal canula. At that time he saturated up to 99%. The patient was unable to have a PERC rule out given age and saturation. CXR showed patchy consolidation in the middle and lower lung fields. Troponin, Ferritin, lactic dehydrogenase were all elevated consistent with increased mortality risk. Plan: The patient will be started on dexamethasone 6 mg and continue oxygen therapy with plans for admission to the floor. Case and management plan discussed with Dr. Chelsea Thompson.       Labs Reviewed   CBC WITH AUTO DIFFERENTIAL - Abnormal; Notable for the following components:       Result Value    RBC 4.69 (*)     MPV 9.2 (*)     Lymphocytes Absolute 0.3 (*)     Immature Grans (Abs) 0.30 (*)     All other components within normal limits   COMPREHENSIVE METABOLIC PANEL W/ REFLEX TO MG FOR LOW K - Abnormal; Notable for the following components:    Sodium 131 (*)     Chloride 96 (*)     Albumin 3.1 (*)     All other components within normal limits   C-REACTIVE PROTEIN - Abnormal; Notable for the following components:    CRP 16.30 (*)     All other components within normal limits   LACTATE DEHYDROGENASE - Abnormal; Notable for the following components:     (*)     All other components within normal limits   FERRITIN - Abnormal; Notable for the following components:    Ferritin 3,201 (*)     All other components within normal limits   TROPONIN - Abnormal; Notable for the following components:    Troponin T 0.012 (*)     All other components within normal limits GLOMERULAR FILTRATION RATE, ESTIMATED - Abnormal; Notable for the following components:    Est, Glom Filt Rate 75 (*)     All other components within normal limits   OSMOLALITY - Abnormal; Notable for the following components:    Osmolality Calc 264.7 (*)     All other components within normal limits   LACTIC ACID, PLASMA   ANION GAP       Medications   dexamethasone (DECADRON) injection 6 mg (6 mg IntraVENous Given 10/23/21 0924)       XR CHEST PORTABLE   Final Result   Scattered patchy opacities throughout both lungs more pronounced on the right and increased in prominence compared to prior CTA as evidence for worsening viral pneumonia. **This report has been created using voice recognition software. It may contain minor errors which are inherent in voice recognition technology. **      Final report electronically signed by Dr. Jemima Plaza MD on 10/23/2021 9:43 AM          Final diagnoses:   None       New Prescriptions    No medications on file           Condition: Acute Hypoxia secondary to COVID-19    Disposition: Admission    Electronically signed by Dakota Sandhu on 10/23/2021 at 10:52 AM       **This is a Medical/PA/APRN Student Note and is charted for educational purposes. The non-physician staff attested note is not to be used for billing purposes, chart documentation or to guide patient care. Please see the supervising physician/PA/APRN modifications/attestation for treatment plan/chart documentation/suggestions. This note has been reviewed and feedback has been provided to the student.  **     Arlin Lea  10/23/21 1119

## 2021-10-23 NOTE — ED PROVIDER NOTES
[place] ENCOUNTER          Pt Name: Da Smith  MRN: [id number]  [place] 1960  Date of evaluation: 10/23/2021  Physician: Perfecto Silva MD, [facility] Speaker, [address]       Chief Complaint   Patient presents with    Positive For Covid-19    Shortness of Breath     History obtained from chart review and the patient. HISTORY OF PRESENT ILLNESS    HPI  Da Smith is a 59 y.o. male who presents to the emergency department for evaluation of shortness of breath and low oxygen saturation. Patient was diagnosed with COVID-19 3 days ago and discharged home with a pulse oximeter after normal work-up. Since then has been getting steadily more short of breath, to the point that minimal efforts made him short of breath. When at rest his oxygen saturation he states is in the low 90s, but with minimal efforts goes down to the high 50s or low 60s. Patient does not currently have any diagnosed medical problems but does not see doctors very often. Currently complaining of shortness of breath at rest.  Patient has not received COVID-19 immunization. The patient has no other acute complaints at this time. REVIEW OF SYSTEMS   Review of Systems   Constitutional: Negative for chills, fever and unexpected weight change. HENT: Negative for congestion, ear pain, nosebleeds, sinus pressure and sinus pain. Eyes: Negative for pain. Respiratory: Positive for shortness of breath. Negative for cough and chest tightness. Cardiovascular: Negative for chest pain. Gastrointestinal: Negative for abdominal pain, constipation, diarrhea and nausea. Endocrine: Negative for polyuria. Genitourinary: Negative for dysuria and flank pain. Musculoskeletal: Negative for arthralgias, back pain, myalgias and neck pain. Skin: Negative for rash. Neurological: Negative for weakness and headaches.    All other systems reviewed and are Ear: External ear normal.      Left Ear: External ear normal.      Nose: Nose normal.      Mouth/Throat:      Mouth: Mucous membranes are moist.   Eyes:      Conjunctiva/sclera: Conjunctivae normal.      Pupils: Pupils are equal, round, and reactive to light. Cardiovascular:      Rate and Rhythm: Normal rate and regular rhythm. Heart sounds: Normal heart sounds. No murmur heard. No friction rub. No gallop. Pulmonary:      Effort: Pulmonary effort is normal. No respiratory distress. Breath sounds: No stridor. Examination of the right-upper field reveals decreased breath sounds. Examination of the left-upper field reveals decreased breath sounds. Examination of the right-middle field reveals decreased breath sounds. Examination of the left-middle field reveals decreased breath sounds. Examination of the right-lower field reveals decreased breath sounds. Examination of the left-lower field reveals decreased breath sounds. Decreased breath sounds and rhonchi (Scattered) present. No wheezing or rales. Musculoskeletal:      Cervical back: Neck supple. Right lower leg: No edema. Left lower leg: No edema. Skin:     General: Skin is warm and dry. Neurological:      Mental Status: He is alert and oriented to person, place, and time. Cranial Nerves: No cranial nerve deficit. Psychiatric:         Behavior: Behavior normal.             MEDICAL DECISION MAKING   Initial Assessment:   1. COVID-19 with hypoxia, severe per [facility] Emergency Department COVID-19 Severity classification. 2. Clinically worsening since last evaluation in the hospital, not vaccinated. Plan:    IV line, labs   Supplemental oxygen   EKG   Imaging   Medication   Observation   Patient will need to be admitted.         ED RESULTS   Laboratory results:  Labs Reviewed   CBC WITH AUTO DIFFERENTIAL - Abnormal; Notable for the following components:       Result Value    RBC 4.69 (*)     MPV 9.2 (*)     Lymphocytes Absolute 0.3 (*)     Immature Grans (Abs) 0.30 (*)     All other components within normal limits   COMPREHENSIVE METABOLIC PANEL W/ REFLEX TO MG FOR LOW K - Abnormal; Notable for the following components:    Sodium 131 (*)     Chloride 96 (*)     Albumin 3.1 (*)     All other components within normal limits   C-REACTIVE PROTEIN - Abnormal; Notable for the following components:    CRP 16.30 (*)     All other components within normal limits   LACTATE DEHYDROGENASE - Abnormal; Notable for the following components:     (*)     All other components within normal limits   FERRITIN - Abnormal; Notable for the following components:    Ferritin 3,201 (*)     All other components within normal limits   TROPONIN - Abnormal; Notable for the following components:    Troponin T 0.012 (*)     All other components within normal limits   GLOMERULAR FILTRATION RATE, ESTIMATED - Abnormal; Notable for the following components:    Est, Glom Filt Rate 75 (*)     All other components within normal limits   OSMOLALITY - Abnormal; Notable for the following components:    Osmolality Calc 264.7 (*)     All other components within normal limits   LACTIC ACID, PLASMA   ANION GAP       Radiologic studies results:  XR CHEST PORTABLE   Final Result   Scattered patchy opacities throughout both lungs more pronounced on the right and increased in prominence compared to prior CTA as evidence for worsening viral pneumonia. **This report has been created using voice recognition software. It may contain minor errors which are inherent in voice recognition technology. **      Final report electronically signed by Dr. Sheilah Pallas, MD on 10/23/2021 9:43 AM                ED COURSE   ED Medications administered this visit:   Medications   dexamethasone (DECADRON) injection 6 mg (6 mg IntraVENous Given 10/23/21 0924)       ED Course as of Oct 23 1059   Sat Oct 23, 2021   0916 [facility] Emergency Department COVID-19 Severity classification: Severe. [DT]      ED Course User Index  [DT] Ravindra Davis MD         MEDICATION CHANGES     New Prescriptions    No medications on file         FINAL DISPOSITION     Final diagnoses:   COVID   Hypoxia     Condition: condition: fair  Dispo: Admit to med/surg floor      This transcription was electronically signed. It was dictated by use of voice recognition software and electronically transcribed. The transcription may contain errors not detected in proofreading.         Ravindra Davis MD  10/23/21 7946

## 2021-10-24 LAB
ALBUMIN SERPL-MCNC: 3.1 G/DL (ref 3.5–5.1)
ALP BLD-CCNC: 124 U/L (ref 38–126)
ALT SERPL-CCNC: 17 U/L (ref 11–66)
ANION GAP SERPL CALCULATED.3IONS-SCNC: 9 MEQ/L (ref 8–16)
AST SERPL-CCNC: 18 U/L (ref 5–40)
BASOPHILS # BLD: 0.3 %
BASOPHILS ABSOLUTE: 0 THOU/MM3 (ref 0–0.1)
BILIRUB SERPL-MCNC: 0.4 MG/DL (ref 0.3–1.2)
BUN BLDV-MCNC: 21 MG/DL (ref 7–22)
CALCIUM SERPL-MCNC: 8.6 MG/DL (ref 8.5–10.5)
CHLORIDE BLD-SCNC: 100 MEQ/L (ref 98–111)
CO2: 27 MEQ/L (ref 23–33)
CREAT SERPL-MCNC: 0.9 MG/DL (ref 0.4–1.2)
EKG ATRIAL RATE: 85 BPM
EKG P AXIS: 63 DEGREES
EKG P-R INTERVAL: 150 MS
EKG Q-T INTERVAL: 392 MS
EKG QRS DURATION: 120 MS
EKG QTC CALCULATION (BAZETT): 466 MS
EKG R AXIS: 54 DEGREES
EKG T AXIS: 62 DEGREES
EKG VENTRICULAR RATE: 85 BPM
EOSINOPHIL # BLD: 0 %
EOSINOPHILS ABSOLUTE: 0 THOU/MM3 (ref 0–0.4)
ERYTHROCYTE [DISTWIDTH] IN BLOOD BY AUTOMATED COUNT: 12.9 % (ref 11.5–14.5)
ERYTHROCYTE [DISTWIDTH] IN BLOOD BY AUTOMATED COUNT: 46.3 FL (ref 35–45)
GFR SERPL CREATININE-BSD FRML MDRD: 85 ML/MIN/1.73M2
GLUCOSE BLD-MCNC: 118 MG/DL (ref 70–108)
HCT VFR BLD CALC: 43.2 % (ref 42–52)
HEMOGLOBIN: 14 GM/DL (ref 14–18)
IMMATURE GRANS (ABS): 0.15 THOU/MM3 (ref 0–0.07)
IMMATURE GRANULOCYTES: 2 %
LYMPHOCYTES # BLD: 5 %
LYMPHOCYTES ABSOLUTE: 0.4 THOU/MM3 (ref 1–4.8)
MCH RBC QN AUTO: 31.3 PG (ref 26–33)
MCHC RBC AUTO-ENTMCNC: 32.4 GM/DL (ref 32.2–35.5)
MCV RBC AUTO: 96.6 FL (ref 80–94)
MONOCYTES # BLD: 5 %
MONOCYTES ABSOLUTE: 0.4 THOU/MM3 (ref 0.4–1.3)
NUCLEATED RED BLOOD CELLS: 0 /100 WBC
PLATELET # BLD: 354 THOU/MM3 (ref 130–400)
PMV BLD AUTO: 9.7 FL (ref 9.4–12.4)
POTASSIUM REFLEX MAGNESIUM: 4.8 MEQ/L (ref 3.5–5.2)
POTASSIUM SERPL-SCNC: 4.8 MEQ/L (ref 3.5–5.2)
RBC # BLD: 4.47 MILL/MM3 (ref 4.7–6.1)
SEG NEUTROPHILS: 87.7 %
SEGMENTED NEUTROPHILS ABSOLUTE COUNT: 6.5 THOU/MM3 (ref 1.8–7.7)
SODIUM BLD-SCNC: 136 MEQ/L (ref 135–145)
TOTAL PROTEIN: 5.8 G/DL (ref 6.1–8)
WBC # BLD: 7.4 THOU/MM3 (ref 4.8–10.8)

## 2021-10-24 PROCEDURE — 36415 COLL VENOUS BLD VENIPUNCTURE: CPT

## 2021-10-24 PROCEDURE — 2580000003 HC RX 258: Performed by: INTERNAL MEDICINE

## 2021-10-24 PROCEDURE — 80053 COMPREHEN METABOLIC PANEL: CPT

## 2021-10-24 PROCEDURE — 85025 COMPLETE CBC W/AUTO DIFF WBC: CPT

## 2021-10-24 PROCEDURE — 99232 SBSQ HOSP IP/OBS MODERATE 35: CPT | Performed by: INTERNAL MEDICINE

## 2021-10-24 PROCEDURE — 6370000000 HC RX 637 (ALT 250 FOR IP): Performed by: INTERNAL MEDICINE

## 2021-10-24 PROCEDURE — 1200000000 HC SEMI PRIVATE

## 2021-10-24 PROCEDURE — 93010 ELECTROCARDIOGRAM REPORT: CPT | Performed by: NUCLEAR MEDICINE

## 2021-10-24 PROCEDURE — 6360000002 HC RX W HCPCS: Performed by: EMERGENCY MEDICINE

## 2021-10-24 PROCEDURE — 6360000002 HC RX W HCPCS: Performed by: INTERNAL MEDICINE

## 2021-10-24 PROCEDURE — 94761 N-INVAS EAR/PLS OXIMETRY MLT: CPT

## 2021-10-24 RX ORDER — POLYETHYLENE GLYCOL 3350 17 G/17G
17 POWDER, FOR SOLUTION ORAL ONCE
Status: COMPLETED | OUTPATIENT
Start: 2021-10-24 | End: 2021-10-24

## 2021-10-24 RX ADMIN — GABAPENTIN 600 MG: 600 TABLET, FILM COATED ORAL at 20:19

## 2021-10-24 RX ADMIN — SODIUM CHLORIDE, PRESERVATIVE FREE 10 ML: 5 INJECTION INTRAVENOUS at 20:20

## 2021-10-24 RX ADMIN — Medication 50 MG: at 08:31

## 2021-10-24 RX ADMIN — BARICITINIB 4 MG: 2 TABLET, FILM COATED ORAL at 14:20

## 2021-10-24 RX ADMIN — DEXAMETHASONE SODIUM PHOSPHATE 6 MG: 4 INJECTION, SOLUTION INTRA-ARTICULAR; INTRALESIONAL; INTRAMUSCULAR; INTRAVENOUS; SOFT TISSUE at 08:33

## 2021-10-24 RX ADMIN — SODIUM CHLORIDE: 9 INJECTION, SOLUTION INTRAVENOUS at 03:55

## 2021-10-24 RX ADMIN — GABAPENTIN 600 MG: 600 TABLET, FILM COATED ORAL at 14:20

## 2021-10-24 RX ADMIN — OXYCODONE HYDROCHLORIDE AND ACETAMINOPHEN 1000 MG: 500 TABLET ORAL at 08:31

## 2021-10-24 RX ADMIN — Medication 2000 UNITS: at 08:31

## 2021-10-24 RX ADMIN — SODIUM CHLORIDE, PRESERVATIVE FREE 10 ML: 5 INJECTION INTRAVENOUS at 08:33

## 2021-10-24 RX ADMIN — ENOXAPARIN SODIUM 30 MG: 100 INJECTION SUBCUTANEOUS at 08:31

## 2021-10-24 RX ADMIN — ENOXAPARIN SODIUM 30 MG: 100 INJECTION SUBCUTANEOUS at 20:19

## 2021-10-24 RX ADMIN — GABAPENTIN 600 MG: 600 TABLET, FILM COATED ORAL at 08:31

## 2021-10-24 RX ADMIN — POLYETHYLENE GLYCOL 3350 17 G: 17 POWDER, FOR SOLUTION ORAL at 20:17

## 2021-10-24 ASSESSMENT — PAIN SCALES - GENERAL
PAINLEVEL_OUTOF10: 0

## 2021-10-24 NOTE — PROGRESS NOTES
Hospitalist Progress Note      Patient:  Dami Richardson    Unit/Bed:6A-05/005-A  YOB: 1960  MRN: [id number]   Acct: [de-identified]   PCP: Jazmyn Wood MD  Date of Admission: 10/23/2021    Assessment/Plan:    1. Acute hypoxic respiratory failure due to COVID-19 PNA: Will start of decadron and baricitinib, Vitamin D, Zinc, Vitamin Cbid lovenox 30mg and prn robitussin; wean O2 as tolerated  10/24/21: Continue to wean O2 as tolerated, check ambulatory oximetry tomorrow  2. Neuropathic pain: c/w home gabapentin  3. Constipation: Miralax and monitor      Expected discharge date:  10/25? Disposition:    [x] Home       [] TCU       [] Rehab       [] Psych       [] SNF       [] [place]       [] Other-    Chief Complaint: SOB    Hospital Treatment Course: (Prior to today) \"58 yo male presents to the ED with complaint of SOB and low oxygen saturation. Diagnosed with COVID-19 3 days PTA, has been getting worse since that time. Noted saturation to be in 90s at rest but as low as 50s with activity. When seen he cites generally feeling well except when trying to ambulate. \"    10/24/21: Give miralax, wean O2     Subjective (past 24 hours): Breathing is improved, still some SOB with activity or extended conversation, but wants to ambulate more       Past medical history, family history, social history and allergies reviewed again and is unchanged since admission. ROS (12 point review of systems completed. Pertinent positives noted.  Otherwise ROS is negative)     Medications:  Reviewed    Infusion Medications    sodium chloride       Scheduled Medications    polyethylene glycol  17 g Oral Once    dexamethasone  6 mg IntraVENous Q24H    gabapentin  600 mg Oral TID    sodium chloride flush  5-40 mL IntraVENous 2 times per day    enoxaparin  30 mg SubCUTAneous BID    Vitamin D  2,000 Units Oral Daily    baricitinib  4 mg Oral Daily    zinc sulfate  50 mg Oral Daily    ascorbic acid  1,000 mg Oral Daily     PRN Meds: sodium chloride flush, sodium chloride, ondansetron **OR** ondansetron, polyethylene glycol, acetaminophen **OR** acetaminophen, guaiFENesin-dextromethorphan      Intake/Output Summary (Last 24 hours) at 10/24/2021 1826  Last data filed at 10/24/2021 1536  Gross per 24 hour   Intake 2445.74 ml   Output 1250 ml   Net 1195.74 ml       Diet:  ADULT DIET; Regular    Exam:  BP (!) 132/90   Pulse 63   Temp 97.4 °F (36.3 °C) (Oral)   Resp 18   Ht 6' (1.829 m)   Wt 175 lb (79.4 kg)   SpO2 95%   BMI 23.73 kg/m²   General appearance:  No apparent distress, appears stated age and cooperative. HEENT: Pupils equal, round, and reactive to light. Conjunctivae/corneas clear. Neck: Supple, with full range of motion. No jugular venous distention. Trachea midline. Respiratory:  Normal respiratory effort. Clear to auscultation, bilaterally without Rales/Wheezes/Rhonchi. Cardiovascular: Regular rate and rhythm with normal S1/S2 without murmurs, rubs or gallops. Abdomen: Soft, non-tender, non-distended with normal bowel sounds. Musculoskeletal: passive and active ROM x 4 extremities. Skin: Skin color, texture, turgor normal.  No rashes or lesions. Neurologic:  Neurovascularly intact without any focal sensory/motor deficits.  Cranial nerves: II-XII intact, grossly non-focal.  Psychiatric: Alert and oriented, thought content appropriate, normal insight  Capillary Refill: Brisk,< 3 seconds   Peripheral Pulses: +2 palpable, equal bilaterally     Labs:   Recent Labs     10/23/21  0920 10/24/21  0658   WBC 6.4 7.4   HGB 14.8 14.0   HCT 44.1 43.2    354     Recent Labs     10/23/21  0920 10/24/21  0658   * 136   K 4.2 4.8  4.8   CL 96* 100   CO2 25 27   BUN 17 21   CREATININE 1.0 0.9   CALCIUM 8.9 8.6     Recent Labs     10/23/21  0920 10/24/21  0658   AST 23 18   ALT 17 17   BILITOT 0.8 0.4   ALKPHOS 122 124     No results for input(s): INR in the last 72 hours. No results for input(s): [place] in the last 72 hours. Microbiology:    Blood culture #1: No results found for: BC    Blood culture #2:No results found for: Marinus Risk    Organism:No results found for: ORG    No results found for: LABGRAM    MRSA culture only:No results found for: [address]     Urine culture: No results found for: LABURIN    Respiratory culture: No results found for: CULTRESP    Aerobic and Anaerobic :  No results found for: LABAERO  No results found for: LABANAE    Urinalysis:    No results found for: [place], BACTERIA, RBCUA, BLOODU, [address], [address]    Radiology:  XR CHEST PORTABLE   Final Result   Scattered patchy opacities throughout both lungs more pronounced on the right and increased in prominence compared to prior CTA as evidence for worsening viral pneumonia. **This report has been created using voice recognition software. It may contain minor errors which are inherent in voice recognition technology. **      Final report electronically signed by Dr. Tiesha Ulrich MD on 10/23/2021 9:43 AM        XR CHEST PORTABLE    Result Date: 10/23/2021  PROCEDURE: XR CHEST PORTABLE CLINICAL INFORMATION: COVID, hypoxia. COMPARISON: CTA chest dated 10/14/2021. TECHNIQUE: AP upright view of the chest. FINDINGS: There are patchy opacities through both lungs more pronounced on the right. These appear more prominent compared to prior CTA. There is a calcified nodule in the left lung as evidence for old granulomatous disease. The pulmonary vasculature and cardiomediastinal silhouette are within normal limits. Visualized osseous structures appear grossly intact. Scattered patchy opacities throughout both lungs more pronounced on the right and increased in prominence compared to prior CTA as evidence for worsening viral pneumonia. **This report has been created using voice recognition software.  It may contain minor errors which are inherent in voice recognition technology. ** Final report electronically signed by Dr. James Cormier MD on 10/23/2021 9:43 AM        DVT prophylaxis: [x] Lovenox                                 [] SCDs                                 [] SQ Heparin                                 [] Encourage ambulation           [] Already on Anticoagulation     Code Status: Full Code    Tele:   [x] yes             [] no    Electronically signed by Domenic Everett DO on 10/24/2021 at 6:26 PM

## 2021-10-25 VITALS
HEIGHT: 72 IN | SYSTOLIC BLOOD PRESSURE: 109 MMHG | TEMPERATURE: 97.8 F | BODY MASS INDEX: 23.7 KG/M2 | RESPIRATION RATE: 16 BRPM | DIASTOLIC BLOOD PRESSURE: 83 MMHG | OXYGEN SATURATION: 94 % | HEART RATE: 66 BPM | WEIGHT: 175 LBS

## 2021-10-25 LAB
ALBUMIN SERPL-MCNC: 2.8 G/DL (ref 3.5–5.1)
ALP BLD-CCNC: 108 U/L (ref 38–126)
ALT SERPL-CCNC: 18 U/L (ref 11–66)
ANION GAP SERPL CALCULATED.3IONS-SCNC: 10 MEQ/L (ref 8–16)
AST SERPL-CCNC: 19 U/L (ref 5–40)
BASOPHILS # BLD: 0.3 %
BASOPHILS ABSOLUTE: 0 THOU/MM3 (ref 0–0.1)
BILIRUB SERPL-MCNC: 0.4 MG/DL (ref 0.3–1.2)
BUN BLDV-MCNC: 24 MG/DL (ref 7–22)
CALCIUM SERPL-MCNC: 8.7 MG/DL (ref 8.5–10.5)
CHLORIDE BLD-SCNC: 105 MEQ/L (ref 98–111)
CO2: 25 MEQ/L (ref 23–33)
CREAT SERPL-MCNC: 0.9 MG/DL (ref 0.4–1.2)
EOSINOPHIL # BLD: 0 %
EOSINOPHILS ABSOLUTE: 0 THOU/MM3 (ref 0–0.4)
ERYTHROCYTE [DISTWIDTH] IN BLOOD BY AUTOMATED COUNT: 12.9 % (ref 11.5–14.5)
ERYTHROCYTE [DISTWIDTH] IN BLOOD BY AUTOMATED COUNT: 45.4 FL (ref 35–45)
GFR SERPL CREATININE-BSD FRML MDRD: 85 ML/MIN/1.73M2
GLUCOSE BLD-MCNC: 110 MG/DL (ref 70–108)
HCT VFR BLD CALC: 40.7 % (ref 42–52)
HEMOGLOBIN: 13.3 GM/DL (ref 14–18)
IMMATURE GRANS (ABS): 0.18 THOU/MM3 (ref 0–0.07)
IMMATURE GRANULOCYTES: 1.6 %
LYMPHOCYTES # BLD: 6 %
LYMPHOCYTES ABSOLUTE: 0.7 THOU/MM3 (ref 1–4.8)
MCH RBC QN AUTO: 31.2 PG (ref 26–33)
MCHC RBC AUTO-ENTMCNC: 32.7 GM/DL (ref 32.2–35.5)
MCV RBC AUTO: 95.5 FL (ref 80–94)
MONOCYTES # BLD: 6 %
MONOCYTES ABSOLUTE: 0.7 THOU/MM3 (ref 0.4–1.3)
NUCLEATED RED BLOOD CELLS: 0 /100 WBC
PLATELET # BLD: 403 THOU/MM3 (ref 130–400)
PMV BLD AUTO: 9.4 FL (ref 9.4–12.4)
POTASSIUM REFLEX MAGNESIUM: 5.3 MEQ/L (ref 3.5–5.2)
RBC # BLD: 4.26 MILL/MM3 (ref 4.7–6.1)
SEG NEUTROPHILS: 86.1 %
SEGMENTED NEUTROPHILS ABSOLUTE COUNT: 9.8 THOU/MM3 (ref 1.8–7.7)
SODIUM BLD-SCNC: 140 MEQ/L (ref 135–145)
TOTAL PROTEIN: 5.4 G/DL (ref 6.1–8)
WBC # BLD: 11.4 THOU/MM3 (ref 4.8–10.8)

## 2021-10-25 PROCEDURE — 99239 HOSP IP/OBS DSCHRG MGMT >30: CPT | Performed by: PHYSICIAN ASSISTANT

## 2021-10-25 PROCEDURE — 85025 COMPLETE CBC W/AUTO DIFF WBC: CPT

## 2021-10-25 PROCEDURE — 6370000000 HC RX 637 (ALT 250 FOR IP): Performed by: INTERNAL MEDICINE

## 2021-10-25 PROCEDURE — 80053 COMPREHEN METABOLIC PANEL: CPT

## 2021-10-25 PROCEDURE — 2580000003 HC RX 258: Performed by: INTERNAL MEDICINE

## 2021-10-25 PROCEDURE — 6360000002 HC RX W HCPCS: Performed by: EMERGENCY MEDICINE

## 2021-10-25 PROCEDURE — 36415 COLL VENOUS BLD VENIPUNCTURE: CPT

## 2021-10-25 PROCEDURE — 6360000002 HC RX W HCPCS: Performed by: INTERNAL MEDICINE

## 2021-10-25 RX ORDER — DOCUSATE SODIUM 100 MG/1
100 CAPSULE, LIQUID FILLED ORAL DAILY
Status: DISCONTINUED | OUTPATIENT
Start: 2021-10-25 | End: 2021-10-25 | Stop reason: HOSPADM

## 2021-10-25 RX ORDER — CHOLECALCIFEROL (VITAMIN D3) 50 MCG
2000 TABLET ORAL DAILY
Qty: 60 TABLET | Refills: 0 | Status: SHIPPED | OUTPATIENT
Start: 2021-10-26

## 2021-10-25 RX ORDER — ZINC SULFATE 50(220)MG
50 CAPSULE ORAL DAILY
Qty: 30 CAPSULE | Refills: 3 | COMMUNITY
Start: 2021-10-26

## 2021-10-25 RX ADMIN — GABAPENTIN 600 MG: 600 TABLET, FILM COATED ORAL at 08:12

## 2021-10-25 RX ADMIN — BARICITINIB 4 MG: 2 TABLET, FILM COATED ORAL at 14:14

## 2021-10-25 RX ADMIN — GABAPENTIN 600 MG: 600 TABLET, FILM COATED ORAL at 14:14

## 2021-10-25 RX ADMIN — SODIUM CHLORIDE, PRESERVATIVE FREE 10 ML: 5 INJECTION INTRAVENOUS at 08:13

## 2021-10-25 RX ADMIN — POLYETHYLENE GLYCOL 3350 17 G: 17 POWDER, FOR SOLUTION ORAL at 14:28

## 2021-10-25 RX ADMIN — Medication 2000 UNITS: at 08:11

## 2021-10-25 RX ADMIN — DEXAMETHASONE SODIUM PHOSPHATE 6 MG: 4 INJECTION, SOLUTION INTRA-ARTICULAR; INTRALESIONAL; INTRAMUSCULAR; INTRAVENOUS; SOFT TISSUE at 08:12

## 2021-10-25 RX ADMIN — OXYCODONE HYDROCHLORIDE AND ACETAMINOPHEN 1000 MG: 500 TABLET ORAL at 08:12

## 2021-10-25 RX ADMIN — Medication 50 MG: at 08:11

## 2021-10-25 RX ADMIN — ENOXAPARIN SODIUM 30 MG: 100 INJECTION SUBCUTANEOUS at 08:12

## 2021-10-25 ASSESSMENT — PAIN SCALES - GENERAL
PAINLEVEL_OUTOF10: 0

## 2021-10-25 NOTE — CARE COORDINATION
10/25/21, 10:20 AM EDT  DISCHARGE PLANNING EVALUATION:    Keyonna Bond       Admitted: 10/23/2021/ 8395   Hospital day: 2   Location: 6A-05/005-A Reason for admit: Hypoxia [R09.02]  Acute hypoxemic respiratory failure due to COVID-19 (HCC) [U07.1, J96.01]  COVID [U07.1]   PMH:  has a past medical history of Nerve damage. Procedure: none  Barriers to Discharge:  Presented to ED with increased sob. + covid on 10/20. Room air at rest, desat with activity. Incentive spirometer. Acapella. Baricitinib. IV decadron. Robitussin. Vit C, D, zinc.   PCP: Gerson Oneal MD  Readmission Risk Score: 9.6%    Patient Goals/Plan/Treatment Preferences: Spoke with Maday Alexander, he plans to return home at discharge. His wife also has covid and he reports she might also be admitted, he is unsure of her condition at this time. He is independent and works outside of home. He does not anticipate discharge needs. Will follow for O2 needs and discuss home O2 preference. Transportation/Food Security/Housekeeping Addressed:  No issues identified.

## 2021-10-25 NOTE — PROGRESS NOTES
Called wife [facility] to update patients status. All questions were answered at this time.

## 2021-10-25 NOTE — PROGRESS NOTES
Payment tubed to Outpatient Pharmacy, change and prescriptions given to patient. Discharge teaching completed to car via wheelchair.

## 2021-10-25 NOTE — PROGRESS NOTES
A home oxygen evaluation has been completed. [x]Patient is an inpatient. It is expected that the patient will be discharged within the next 48 hours. Qualified provider to write order for home prescription if patient qualifies. Social service/care managers will arrange for home oxygen. If patient is active, arrange for Home Medical supplier to assess for Oxygen Conserving Device per pulse oximetry. []Patient is an outpatient. Results will be faxed to the ordering provider. Qualified provider to write order for home prescription if patient qualifies and arranges for home oxygen. Patient was placed on room air for 20 minutes. SpO2 was 95 % on room air at rest. Patients SpO2 was below 89% and qualified for home oxygen. Oxygen was applied at 0 lpm via room air to maintain a SpO2 between 90-92% while at rest. Actual SpO2 was 95 %. Patient able to ambulate for exercise flow rate. Patients was ambulated, SpO2 was 91% on 0 lpm to maintain SpO2 between 90-92% while exercising. Note: For any SpO2 at 91% see policy and procedure for possible qualifications.

## 2021-10-26 ENCOUNTER — CARE COORDINATION (OUTPATIENT)
Dept: CASE MANAGEMENT | Age: 61
End: 2021-10-26

## 2021-10-26 NOTE — DISCHARGE SUMMARY
Hospitalist Discharge Summary        Patient: Matty Burton  YOB: 1960  MRN: [id number]   Acct: [de-identified]    Primary Care Physician: Lexie Leach MD    Admit date  10/23/2021    Discharge date:  10/26/2021 3:47 PM    Chief Complaint on presentation :-  SOB    Discharge Assessment and Plan:-      1. Acute hypoxic respiratory failure due to COVID-19 PNA: Will start of decadron and baricitinib, Vitamin D, Zinc, Vitamin Cbid lovenox 30mg and prn robitussin; wean O2 as tolerated  10/24/21: Continue to wean O2 as tolerated, check ambulatory oximetry tomorrow  10/25: pt weaned to RA, stop Decadron / Baricitinib as being discharged  2. Hyperkalemia: K noted at 5.3, pt instructed to follow up with his PCP, scheduled for Monday. PCP to repeat BMP. 3. Neuropathic pain: c/w home gabapentin  4. Constipation: Miralax and monitor       Initial H and P and Hospital course:-  \"60 yo male presents to the ED with complaint of SOB and low oxygen saturation. Diagnosed with COVID-19 3 days PTA, has been getting worse since that time. Noted saturation to be in 90s at rest but as low as 50s with activity. When seen he cites generally feeling well except when trying to ambulate. \"    Please see above for full details. On day of discharge, pt was comfortable and wanted to return home. He was weaned to RA, and did have home O2 eval completed which showed that pt did not require any O2 at rest or with exertion. Pt was instructed to continue taking his vitamins / zinc and to follow up with his PCP. K was noted at 5.3, and pt will need f/u with repeat BMP per PCP. At time of discharge pt was able to ambulate about the room, albeit with some exertional dyspnea noted and was tolerating a diet well. All VSS. Physical Exam:-  Vitals:   No data found. Weight:   Weight: 175 lb (79.4 kg)   24 hour intake/output:   No intake or output data in the 24 hours ending 10/26/21 1547    1.  General appearance: No apparent distress, appears stated age and cooperative. 2. HEENT: Normal cephalic, atraumatic without obvious deformity. Pupils equal, round, and reactive to light. Extra ocular muscles intact. Conjunctivae/corneas clear. 3. Neck: Supple, with full range of motion. No jugular venous distention. Trachea midline. 4. Respiratory:  Normal respiratory effort. Diminished to auscultation, bilaterally without Rales/Wheezes/Rhonchi. 5. Cardiovascular: Regular rate and rhythm with normal S1/S2 without murmurs, rubs or gallops. 6. Abdomen: Soft, non-tender, non-distended with normal bowel sounds. 7. Musculoskeletal:  No clubbing, cyanosis or edema bilaterally. 8. Skin: Skin color, texture, turgor normal.  No rashes or lesions. 9. Neurologic:  Neurovascularly intact without any focal sensory/motor deficits. Cranial nerves: II-XII intact, grossly non-focal.  10. Psychiatric: Alert and oriented x4, thought content appropriate, normal insight  11. Capillary Refill: Brisk,< 3 seconds   12.  Peripheral Pulses: +2 palpable, equal bilaterally       Discharge Medications:-      Medication List      START taking these medications    ascorbic acid 1000 MG tablet  Commonly known as: VITAMIN C  Take 1 tablet by mouth daily     vitamin D 50 MCG (2000 UT) Tabs tablet  Commonly known as: CHOLECALCIFEROL  Take 1 tablet by mouth daily     zinc sulfate 220 (50 Zn) MG capsule  Commonly known as: ZINCATE  Take 1 capsule by mouth daily        CONTINUE taking these medications    GABAPENTIN PO           Where to Get Your Medications      These medications were sent to [facility] [address], [address]  [address], [address] [place] [id number]    Phone: [phone]   · ascorbic acid 1000 MG tablet  · vitamin D 50 MCG (2000 UT) Tabs tablet     You can get these medications from any pharmacy    You don't need a prescription for these medications  · zinc sulfate 220 (50 Zn) MG capsule          Labs :-  Recent Results (from the past 72 hour(s))   CBC Auto Differential    Collection Time: 10/24/21  6:58 AM   Result Value Ref Range    WBC 7.4 4.8 - 10.8 thou/mm3    RBC 4.47 (L) 4.70 - 6.10 mill/mm3    Hemoglobin 14.0 14.0 - 18.0 gm/dl    Hematocrit 43.2 42.0 - 52.0 %    MCV 96.6 (H) 80.0 - 94.0 fL    MCH 31.3 26.0 - 33.0 pg    MCHC 32.4 32.2 - 35.5 gm/dl    RDW-CV 12.9 11.5 - 14.5 %    RDW-SD 46.3 (H) 35.0 - 45.0 fL    Platelets 186 851 - 830 thou/mm3    MPV 9.7 9.4 - 12.4 fL    Seg Neutrophils 87.7 %    Lymphocytes 5.0 %    Monocytes 5.0 %    Eosinophils 0.0 %    Basophils 0.3 %    Immature Granulocytes 2.0 %    Segs Absolute 6.5 1 - 7 thou/mm3    Lymphocytes Absolute 0.4 (L) 1.0 - 4.8 thou/mm3    Monocytes Absolute 0.4 0.4 - 1.3 thou/mm3    Eosinophils Absolute 0.0 0.0 - 0.4 thou/mm3    Basophils Absolute 0.0 0.0 - 0.1 thou/mm3    Immature Grans (Abs) 0.15 (H) 0.00 - 0.07 thou/mm3    nRBC 0 /100 wbc   Comprehensive Metabolic Panel w/ Reflex to MG    Collection Time: 10/24/21  6:58 AM   Result Value Ref Range    Potassium reflex Magnesium 4.8 3.5 - 5.2 meq/L    AST 18 5 - 40 U/L    Alkaline Phosphatase 124 38 - 126 U/L    Total Protein 5.8 (L) 6.1 - 8.0 g/dL    Albumin 3.1 (L) 3.5 - 5.1 g/dL    Total Bilirubin 0.4 0.3 - 1.2 mg/dL    ALT 17 11 - 66 U/L   Basic Metabolic Panel    Collection Time: 10/24/21  6:58 AM   Result Value Ref Range    Sodium 136 135 - 145 meq/L    Potassium 4.8 3.5 - 5.2 meq/L    Chloride 100 98 - 111 meq/L    CO2 27 23 - 33 meq/L    Glucose 118 (H) 70 - 108 mg/dL    BUN 21 7 - 22 mg/dL    CREATININE 0.9 0.4 - 1.2 mg/dL    Calcium 8.6 8.5 - 10.5 mg/dL   Anion Gap    Collection Time: 10/24/21  6:58 AM   Result Value Ref Range    Anion Gap 9.0 8.0 - 16.0 meq/L   Glomerular Filtration Rate, Estimated    Collection Time: 10/24/21  6:58 AM   Result Value Ref Range    Est, Glom Filt Rate 85 (A) ml/min/1.73m2   CBC Auto Differential    Collection Time: 10/25/21  6:24 AM Result Value Ref Range    WBC 11.4 (H) 4.8 - 10.8 thou/mm3    RBC 4.26 (L) 4.70 - 6.10 mill/mm3    Hemoglobin 13.3 (L) 14.0 - 18.0 gm/dl    Hematocrit 40.7 (L) 42.0 - 52.0 %    MCV 95.5 (H) 80.0 - 94.0 fL    MCH 31.2 26.0 - 33.0 pg    MCHC 32.7 32.2 - 35.5 gm/dl    RDW-CV 12.9 11.5 - 14.5 %    RDW-SD 45.4 (H) 35.0 - 45.0 fL    Platelets 274 (H) 578 - 400 thou/mm3    MPV 9.4 9.4 - 12.4 fL    Seg Neutrophils 86.1 %    Lymphocytes 6.0 %    Monocytes 6.0 %    Eosinophils 0.0 %    Basophils 0.3 %    Immature Granulocytes 1.6 %    Segs Absolute 9.8 (H) 1 - 7 thou/mm3    Lymphocytes Absolute 0.7 (L) 1.0 - 4.8 thou/mm3    Monocytes Absolute 0.7 0.4 - 1.3 thou/mm3    Eosinophils Absolute 0.0 0.0 - 0.4 thou/mm3    Basophils Absolute 0.0 0.0 - 0.1 thou/mm3    Immature Grans (Abs) 0.18 (H) 0.00 - 0.07 thou/mm3    nRBC 0 /100 wbc   Comprehensive Metabolic Panel w/ Reflex to MG    Collection Time: 10/25/21  6:24 AM   Result Value Ref Range    Glucose 110 (H) 70 - 108 mg/dL    CREATININE 0.9 0.4 - 1.2 mg/dL    BUN 24 (H) 7 - 22 mg/dL    Sodium 140 135 - 145 meq/L    Potassium reflex Magnesium 5.3 (H) 3.5 - 5.2 meq/L    Chloride 105 98 - 111 meq/L    CO2 25 23 - 33 meq/L    Calcium 8.7 8.5 - 10.5 mg/dL    AST 19 5 - 40 U/L    Alkaline Phosphatase 108 38 - 126 U/L    Total Protein 5.4 (L) 6.1 - 8.0 g/dL    Albumin 2.8 (L) 3.5 - 5.1 g/dL    Total Bilirubin 0.4 0.3 - 1.2 mg/dL    ALT 18 11 - 66 U/L   Anion Gap    Collection Time: 10/25/21  6:24 AM   Result Value Ref Range    Anion Gap 10.0 8.0 - 16.0 meq/L   Glomerular Filtration Rate, Estimated    Collection Time: 10/25/21  6:24 AM   Result Value Ref Range    Est, Glom Filt Rate 85 (A) ml/min/1.73m2        Microbiology:    Blood culture #1: No results found for: BC    Blood culture #2:No results found for: BLOODCULT2    Organism:  No results found for: LABGRAM    MRSA culture only:No results found for: [facility]    Urine culture: No results found for: LABURIN  No results found for: [facility] Respiratory culture: No results found for: CULTRESP    Aerobic and Anaerobic :  No results found for: LABAERO  No results found for: LABANAE    Urinalysis:    No results found for: [place], BACTERIA, RBCUA, BLOODU, [address]    Radiology:-  XR CHEST PORTABLE    Result Date: 10/23/2021  PROCEDURE: XR CHEST PORTABLE CLINICAL INFORMATION: COVID, hypoxia. COMPARISON: CTA chest dated 10/14/2021. TECHNIQUE: AP upright view of the chest. FINDINGS: There are patchy opacities through both lungs more pronounced on the right. These appear more prominent compared to prior CTA. There is a calcified nodule in the left lung as evidence for old granulomatous disease. The pulmonary vasculature and cardiomediastinal silhouette are within normal limits. Visualized osseous structures appear grossly intact. Scattered patchy opacities throughout both lungs more pronounced on the right and increased in prominence compared to prior CTA as evidence for worsening viral pneumonia. **This report has been created using voice recognition software. It may contain minor errors which are inherent in voice recognition technology. ** Final report electronically signed by Dr. Sharla Summers MD on 10/23/2021 9:43 AM       Follow-up scheduled after discharge :-    in the next few days with Bala Murphy MD     Consultations during this hospital stay:-  [x] NONE [] Cardiology  [] Nephrology  [] Hemo onco   [] GI   [] ID  [] Endocrine  [] Pulm    [] Neuro    [] Psych   [] Urology  [] ENT   [] G SURGERY   []Ortho    []CV surg    [] Palliative  [] Hospice [] Pain management   []    []TCU   [] PT/OT  OTHERS:-    Disposition: home  Condition at Discharge: Stable    Time Spent:- 45 minutes    Electronically signed by Chiquis Cortes PA-C on 10/26/21 at 3:47 PM EDT   Discharging Hospitalist

## 2021-10-26 NOTE — CARE COORDINATION
Covid-19 Initial Follow Up Call    Patient contacted regarding COVID-19 risk, exposure and diagnosis. Discussed COVID-19 related testing which was available at this time. Test results were positive. Patient informed of results, if available? Yes. Care Transition Nurse contacted the patient by telephone to perform post discharge assessment. Call within 2 business days of discharge: Yes. Verified name and  with patient as identifiers. Provided introduction to self, and explanation of the CTN/ACM role, and reason for call due to risk factors for infection and/or exposure to COVID-19. Spoke with Sheri Johnnyyamileth, said he is still having issues with his O2 sats dropping with activity. Will be high 90's and then drop down in the 80's but recovers quickly. Told him to pace himself and not do any chores around the house, get plenty of rest but walking will also help his lungs. He did not qualify for O2 while in the hospital.  He is using the IS several times a day. He is not coughing as much as he was. He does not feel that he has a temp. His appetite is coming back, ate some oatmeal this morning. Encouraged him to drink plenty of fluids, like water, juice, Gatorade. Edwige Yu he was having some diarrhea in the hospital, got a \"light\" enema before he left. Edwige Yu he usually goes everyday and had a small BM this morning but not like usual.  Is concerned with constipation. Suggested he wait a day or 2 and see if his bowels start moving as usual since he wasn't eating normal.  If not, may need to take bowel med, like Miralax if ok with PCP. Has an appt with him on Monday. Reviewed medications. His wife is in the hospital for Covid and he will be able to talk to her on the phone. Told him I would call him again on Thur to see how he is doing. No other questions or concerns at this time. Will continue to follow.     Symptoms reviewed with patient who verbalized the following symptoms: no new symptoms and no worsening symptoms. Due to no new or worsening symptoms encounter was not routed to provider for escalation. Discussed follow-up appointments. If no appointment was previously scheduled, appointment scheduling offered: na. [facility] follow up appointment(s): No future appointments. Non-[facility] follow up appointment(s): PCP 11/1    Non-face-to-face services provided:  Scheduled appointment with PCP-11/1  Obtained and reviewed discharge summary and/or continuity of care documents     Advance Care Planning:   Does patient have an Advance Directive:  not on file. Educated patient about risk for severe COVID-19 due to risk factors according to CDC guidelines. CTN reviewed discharge instructions, medical action plan and red flag symptoms with the patient who verbalized understanding. Discussed COVID vaccination status: Yes. Education provided on COVID-19 vaccination as appropriate. Discussed exposure protocols and quarantine with CDC Guidelines. Patient was given an opportunity to verbalize any questions and concerns and agrees to contact CTN or health care provider for questions related to their healthcare. Reviewed and educated patient on any new and changed medications related to discharge diagnosis     Was patient discharged with a pulse oximeter? No, has at home. Discussed and confirmed pulse oximeter discharge instructions and when to notify provider or seek emergency care. CTN provided contact information. Plan for follow-up call in 1-2 days based on severity of symptoms and risk factors.

## 2021-10-28 ENCOUNTER — CARE COORDINATION (OUTPATIENT)
Dept: CASE MANAGEMENT | Age: 61
End: 2021-10-28

## 2021-10-28 NOTE — CARE COORDINATION
Covid-19 Subsequent Follow Up Call    Patient contacted regarding COVID-19 risk, exposure and diagnosis. Discussed COVID-19 related testing which was available at this time. Test results were positive. Patient informed of results, if available? Yes    Care Transition Nurse contacted the patient by telephone to perform follow-up assessment. Verified name and  with patient as identifiers. Patient has following risk factors of: no known risk factors and Covid. Spoke with Monik Figueredo, said he is doing ok. He no longer has a cough but his O2 sats still drop with activity but recovers quickly when he rests. He did not qualify for O2 while in the hospital.  His sats at rest are 95-97% on RA. He is still using the IS several times a day. He is not doing anything much except sitting in the house worrying about his wife who is in the hospital with Covid. She is on a full face mask and cannot speak to him but nurses keep him updated during the day. His appetite is slowly improving and continues to drink plenty of water and other electrolyte fluids. He is having formed BMs, just not as was his normal but said he is not eating as much as he was. He will see his PCP on Monday. No other questions or concerns at this time. Will continue to follow. Symptoms reviewed with patient who verbalized the following symptoms: no new symptoms and no worsening symptoms. Due to no new or worsening symptoms encounter was not routed to provider for escalation. Educated patient about risk for severe COVID-19 due to risk factors according to CDC guidelines. CTN reviewed discharge instructions, medical action plan and red flag symptoms with the patient who verbalized understanding. Discussed COVID vaccination status: Yes. Education provided on COVID-19 vaccination as appropriate. Discussed exposure protocols and quarantine with CDC Guidelines.  Patient was given an opportunity to verbalize any questions and concerns and agrees to contact CTN or health care provider for questions related to their healthcare. Was patient discharged with a pulse oximeter? No, has at home. Discussed and confirmed pulse oximeter discharge instructions and when to notify provider or seek emergency care. CTN provided contact information. Plan for follow-up call in 5-7 days based on severity of symptoms and risk factors.

## 2021-10-31 ENCOUNTER — APPOINTMENT (OUTPATIENT)
Dept: GENERAL RADIOLOGY | Age: 61
DRG: 177 | End: 2021-10-31
Payer: COMMERCIAL

## 2021-10-31 ENCOUNTER — APPOINTMENT (OUTPATIENT)
Dept: CT IMAGING | Age: 61
DRG: 177 | End: 2021-10-31
Payer: COMMERCIAL

## 2021-10-31 ENCOUNTER — HOSPITAL ENCOUNTER (INPATIENT)
Age: 61
LOS: 3 days | Discharge: HOME | DRG: 177 | End: 2021-11-03
Attending: EMERGENCY MEDICINE | Admitting: INTERNAL MEDICINE
Payer: COMMERCIAL

## 2021-10-31 DIAGNOSIS — I26.99 RIGHT PULMONARY EMBOLUS (HCC): Primary | ICD-10-CM

## 2021-10-31 DIAGNOSIS — J18.9 PNEUMONIA OF RIGHT LOWER LOBE DUE TO INFECTIOUS ORGANISM: ICD-10-CM

## 2021-10-31 DIAGNOSIS — I26.99 PULMONARY EMBOLISM ON RIGHT (HCC): ICD-10-CM

## 2021-10-31 LAB
ALBUMIN SERPL-MCNC: 3 G/DL (ref 3.5–5.1)
ALP BLD-CCNC: 124 U/L (ref 38–126)
ALT SERPL-CCNC: 32 U/L (ref 11–66)
ANION GAP SERPL CALCULATED.3IONS-SCNC: 10 MEQ/L (ref 8–16)
APTT: 32.8 SECONDS (ref 22–38)
AST SERPL-CCNC: 22 U/L (ref 5–40)
BASE EXCESS (CALCULATED): -0.4 MMOL/L (ref -2.5–2.5)
BASOPHILS # BLD: 0 %
BASOPHILS ABSOLUTE: 0 THOU/MM3 (ref 0–0.1)
BILIRUB SERPL-MCNC: 0.6 MG/DL (ref 0.3–1.2)
BILIRUBIN DIRECT: < 0.2 MG/DL (ref 0–0.3)
BUN BLDV-MCNC: 14 MG/DL (ref 7–22)
CALCIUM SERPL-MCNC: 8.8 MG/DL (ref 8.5–10.5)
CHLORIDE BLD-SCNC: 95 MEQ/L (ref 98–111)
CO2: 25 MEQ/L (ref 23–33)
COLLECTED BY:: NORMAL
CREAT SERPL-MCNC: 0.9 MG/DL (ref 0.4–1.2)
D-DIMER QUANTITATIVE: 2089 NG/ML FEU (ref 0–500)
DEVICE: NORMAL
DIFFERENTIAL, MANUAL: NORMAL
EKG ATRIAL RATE: 93 BPM
EKG P AXIS: 61 DEGREES
EKG P-R INTERVAL: 144 MS
EKG Q-T INTERVAL: 360 MS
EKG QRS DURATION: 112 MS
EKG QTC CALCULATION (BAZETT): 447 MS
EKG R AXIS: 41 DEGREES
EKG T AXIS: 62 DEGREES
EKG VENTRICULAR RATE: 93 BPM
EOSINOPHIL # BLD: 0 %
EOSINOPHILS ABSOLUTE: 0 THOU/MM3 (ref 0–0.4)
ERYTHROCYTE [DISTWIDTH] IN BLOOD BY AUTOMATED COUNT: 13.1 % (ref 11.5–14.5)
ERYTHROCYTE [DISTWIDTH] IN BLOOD BY AUTOMATED COUNT: 45.9 FL (ref 35–45)
GFR SERPL CREATININE-BSD FRML MDRD: 85 ML/MIN/1.73M2
GLUCOSE BLD-MCNC: 122 MG/DL (ref 70–108)
HCO3: 24 MMOL/L (ref 23–28)
HCT VFR BLD CALC: 39.3 % (ref 42–52)
HEMOGLOBIN: 13 GM/DL (ref 14–18)
HEPARIN UNFRACTIONATED: 0.1 U/ML (ref 0.3–0.7)
HEPARIN UNFRACTIONATED: 0.44 U/ML (ref 0.3–0.7)
LACTIC ACID: 0.6 MMOL/L (ref 0.5–2)
LYMPHOCYTES # BLD: 3 %
LYMPHOCYTES ABSOLUTE: 0.4 THOU/MM3 (ref 1–4.8)
MCH RBC QN AUTO: 31.4 PG (ref 26–33)
MCHC RBC AUTO-ENTMCNC: 33.1 GM/DL (ref 32.2–35.5)
MCV RBC AUTO: 94.9 FL (ref 80–94)
MONOCYTES # BLD: 9 %
MONOCYTES ABSOLUTE: 1.3 THOU/MM3 (ref 0.4–1.3)
NUCLEATED RED BLOOD CELLS: 0 /100 WBC
O2 SATURATION: 95 %
OSMOLALITY CALCULATION: 262.6 MOSMOL/KG (ref 275–300)
PCO2: 38 MMHG (ref 35–45)
PH BLOOD GAS: 7.41 (ref 7.35–7.45)
PLATELET # BLD: 249 THOU/MM3 (ref 130–400)
PLATELET ESTIMATE: ADEQUATE
PMV BLD AUTO: 9.4 FL (ref 9.4–12.4)
PO2: 72 MMHG (ref 71–104)
POTASSIUM REFLEX MAGNESIUM: 4.7 MEQ/L (ref 3.5–5.2)
PRO-BNP: 231.6 PG/ML (ref 0–900)
PROCALCITONIN: 0.29 NG/ML (ref 0.01–0.09)
RBC # BLD: 4.14 MILL/MM3 (ref 4.7–6.1)
SEG NEUTROPHILS: 88 %
SEGMENTED NEUTROPHILS ABSOLUTE COUNT: 12.6 THOU/MM3 (ref 1.8–7.7)
SODIUM BLD-SCNC: 130 MEQ/L (ref 135–145)
TOTAL PROTEIN: 6.6 G/DL (ref 6.1–8)
TROPONIN T: < 0.01 NG/ML
WBC # BLD: 14.3 THOU/MM3 (ref 4.8–10.8)

## 2021-10-31 PROCEDURE — 84484 ASSAY OF TROPONIN QUANT: CPT

## 2021-10-31 PROCEDURE — 6370000000 HC RX 637 (ALT 250 FOR IP): Performed by: EMERGENCY MEDICINE

## 2021-10-31 PROCEDURE — 93005 ELECTROCARDIOGRAM TRACING: CPT | Performed by: EMERGENCY MEDICINE

## 2021-10-31 PROCEDURE — 85025 COMPLETE CBC W/AUTO DIFF WBC: CPT

## 2021-10-31 PROCEDURE — G0378 HOSPITAL OBSERVATION PER HR: HCPCS

## 2021-10-31 PROCEDURE — 94669 MECHANICAL CHEST WALL OSCILL: CPT

## 2021-10-31 PROCEDURE — 83880 ASSAY OF NATRIURETIC PEPTIDE: CPT

## 2021-10-31 PROCEDURE — 6360000004 HC RX CONTRAST MEDICATION: Performed by: EMERGENCY MEDICINE

## 2021-10-31 PROCEDURE — 99223 1ST HOSP IP/OBS HIGH 75: CPT | Performed by: PHYSICIAN ASSISTANT

## 2021-10-31 PROCEDURE — 85379 FIBRIN DEGRADATION QUANT: CPT

## 2021-10-31 PROCEDURE — 1200000003 HC TELEMETRY R&B

## 2021-10-31 PROCEDURE — 96365 THER/PROPH/DIAG IV INF INIT: CPT

## 2021-10-31 PROCEDURE — 85730 THROMBOPLASTIN TIME PARTIAL: CPT

## 2021-10-31 PROCEDURE — 82803 BLOOD GASES ANY COMBINATION: CPT

## 2021-10-31 PROCEDURE — 96375 TX/PRO/DX INJ NEW DRUG ADDON: CPT

## 2021-10-31 PROCEDURE — 2580000003 HC RX 258: Performed by: PHYSICIAN ASSISTANT

## 2021-10-31 PROCEDURE — 84145 PROCALCITONIN (PCT): CPT

## 2021-10-31 PROCEDURE — 96368 THER/DIAG CONCURRENT INF: CPT

## 2021-10-31 PROCEDURE — 80076 HEPATIC FUNCTION PANEL: CPT

## 2021-10-31 PROCEDURE — 2580000003 HC RX 258: Performed by: EMERGENCY MEDICINE

## 2021-10-31 PROCEDURE — 71275 CT ANGIOGRAPHY CHEST: CPT

## 2021-10-31 PROCEDURE — 6360000002 HC RX W HCPCS: Performed by: PHYSICIAN ASSISTANT

## 2021-10-31 PROCEDURE — XW0DXM6 INTRODUCTION OF BARICITINIB INTO MOUTH AND PHARYNX, EXTERNAL APPROACH, NEW TECHNOLOGY GROUP 6: ICD-10-PCS | Performed by: INTERNAL MEDICINE

## 2021-10-31 PROCEDURE — 80048 BASIC METABOLIC PNL TOTAL CA: CPT

## 2021-10-31 PROCEDURE — 85520 HEPARIN ASSAY: CPT

## 2021-10-31 PROCEDURE — 36600 WITHDRAWAL OF ARTERIAL BLOOD: CPT

## 2021-10-31 PROCEDURE — 6370000000 HC RX 637 (ALT 250 FOR IP): Performed by: PHYSICIAN ASSISTANT

## 2021-10-31 PROCEDURE — 36415 COLL VENOUS BLD VENIPUNCTURE: CPT

## 2021-10-31 PROCEDURE — 96367 TX/PROPH/DG ADDL SEQ IV INF: CPT

## 2021-10-31 PROCEDURE — 87040 BLOOD CULTURE FOR BACTERIA: CPT

## 2021-10-31 PROCEDURE — 96366 THER/PROPH/DIAG IV INF ADDON: CPT

## 2021-10-31 PROCEDURE — 71045 X-RAY EXAM CHEST 1 VIEW: CPT

## 2021-10-31 PROCEDURE — 96376 TX/PRO/DX INJ SAME DRUG ADON: CPT

## 2021-10-31 PROCEDURE — 83605 ASSAY OF LACTIC ACID: CPT

## 2021-10-31 PROCEDURE — 6360000002 HC RX W HCPCS: Performed by: EMERGENCY MEDICINE

## 2021-10-31 PROCEDURE — 99285 EMERGENCY DEPT VISIT HI MDM: CPT

## 2021-10-31 RX ORDER — ASCORBIC ACID 500 MG
1000 TABLET ORAL DAILY
Status: DISCONTINUED | OUTPATIENT
Start: 2021-10-31 | End: 2021-11-03 | Stop reason: HOSPADM

## 2021-10-31 RX ORDER — 0.9 % SODIUM CHLORIDE 0.9 %
1000 INTRAVENOUS SOLUTION INTRAVENOUS ONCE
Status: COMPLETED | OUTPATIENT
Start: 2021-10-31 | End: 2021-10-31

## 2021-10-31 RX ORDER — ONDANSETRON 2 MG/ML
4 INJECTION INTRAMUSCULAR; INTRAVENOUS EVERY 6 HOURS PRN
Status: DISCONTINUED | OUTPATIENT
Start: 2021-10-31 | End: 2021-11-03 | Stop reason: HOSPADM

## 2021-10-31 RX ORDER — HEPARIN SODIUM 1000 [USP'U]/ML
40 INJECTION, SOLUTION INTRAVENOUS; SUBCUTANEOUS PRN
Status: DISCONTINUED | OUTPATIENT
Start: 2021-10-31 | End: 2021-11-01

## 2021-10-31 RX ORDER — SODIUM CHLORIDE 0.9 % (FLUSH) 0.9 %
5-40 SYRINGE (ML) INJECTION EVERY 12 HOURS SCHEDULED
Status: DISCONTINUED | OUTPATIENT
Start: 2021-10-31 | End: 2021-11-03 | Stop reason: HOSPADM

## 2021-10-31 RX ORDER — SODIUM CHLORIDE 0.9 % (FLUSH) 0.9 %
5-40 SYRINGE (ML) INJECTION PRN
Status: DISCONTINUED | OUTPATIENT
Start: 2021-10-31 | End: 2021-11-03 | Stop reason: HOSPADM

## 2021-10-31 RX ORDER — POLYETHYLENE GLYCOL 3350 17 G/17G
17 POWDER, FOR SOLUTION ORAL DAILY PRN
Status: DISCONTINUED | OUTPATIENT
Start: 2021-10-31 | End: 2021-11-03 | Stop reason: HOSPADM

## 2021-10-31 RX ORDER — HEPARIN SODIUM 1000 [USP'U]/ML
80 INJECTION, SOLUTION INTRAVENOUS; SUBCUTANEOUS ONCE
Status: COMPLETED | OUTPATIENT
Start: 2021-10-31 | End: 2021-10-31

## 2021-10-31 RX ORDER — ACETAMINOPHEN 325 MG/1
650 TABLET ORAL EVERY 6 HOURS PRN
Status: DISCONTINUED | OUTPATIENT
Start: 2021-10-31 | End: 2021-11-03 | Stop reason: HOSPADM

## 2021-10-31 RX ORDER — SODIUM CHLORIDE 9 MG/ML
INJECTION, SOLUTION INTRAVENOUS CONTINUOUS
Status: ACTIVE | OUTPATIENT
Start: 2021-10-31 | End: 2021-11-01

## 2021-10-31 RX ORDER — HEPARIN SODIUM 1000 [USP'U]/ML
80 INJECTION, SOLUTION INTRAVENOUS; SUBCUTANEOUS PRN
Status: DISCONTINUED | OUTPATIENT
Start: 2021-10-31 | End: 2021-11-01

## 2021-10-31 RX ORDER — GABAPENTIN 600 MG/1
600 TABLET ORAL 2 TIMES DAILY
Status: DISCONTINUED | OUTPATIENT
Start: 2021-10-31 | End: 2021-11-03 | Stop reason: HOSPADM

## 2021-10-31 RX ORDER — SODIUM CHLORIDE 9 MG/ML
25 INJECTION, SOLUTION INTRAVENOUS PRN
Status: DISCONTINUED | OUTPATIENT
Start: 2021-10-31 | End: 2021-11-03 | Stop reason: HOSPADM

## 2021-10-31 RX ORDER — ACETAMINOPHEN 325 MG/1
650 TABLET ORAL ONCE
Status: COMPLETED | OUTPATIENT
Start: 2021-10-31 | End: 2021-10-31

## 2021-10-31 RX ORDER — ONDANSETRON 4 MG/1
4 TABLET, ORALLY DISINTEGRATING ORAL EVERY 8 HOURS PRN
Status: DISCONTINUED | OUTPATIENT
Start: 2021-10-31 | End: 2021-11-03 | Stop reason: HOSPADM

## 2021-10-31 RX ORDER — ACETAMINOPHEN 650 MG/1
650 SUPPOSITORY RECTAL EVERY 6 HOURS PRN
Status: DISCONTINUED | OUTPATIENT
Start: 2021-10-31 | End: 2021-11-03 | Stop reason: HOSPADM

## 2021-10-31 RX ORDER — CEFDINIR 300 MG/1
300 CAPSULE ORAL EVERY 12 HOURS SCHEDULED
Status: DISCONTINUED | OUTPATIENT
Start: 2021-10-31 | End: 2021-11-01

## 2021-10-31 RX ORDER — GUAIFENESIN 600 MG/1
600 TABLET, EXTENDED RELEASE ORAL 2 TIMES DAILY
Status: DISCONTINUED | OUTPATIENT
Start: 2021-10-31 | End: 2021-11-03 | Stop reason: HOSPADM

## 2021-10-31 RX ORDER — LACTOBACILLUS RHAMNOSUS GG 10B CELL
1 CAPSULE ORAL
Status: DISCONTINUED | OUTPATIENT
Start: 2021-11-01 | End: 2021-11-03 | Stop reason: HOSPADM

## 2021-10-31 RX ORDER — ZINC SULFATE 50(220)MG
50 CAPSULE ORAL DAILY
Status: DISCONTINUED | OUTPATIENT
Start: 2021-10-31 | End: 2021-11-03 | Stop reason: HOSPADM

## 2021-10-31 RX ORDER — HEPARIN SODIUM 10000 [USP'U]/100ML
5-30 INJECTION, SOLUTION INTRAVENOUS CONTINUOUS
Status: DISCONTINUED | OUTPATIENT
Start: 2021-10-31 | End: 2021-11-01

## 2021-10-31 RX ORDER — AZITHROMYCIN 250 MG/1
250 TABLET, FILM COATED ORAL DAILY
Status: DISCONTINUED | OUTPATIENT
Start: 2021-11-01 | End: 2021-11-03 | Stop reason: HOSPADM

## 2021-10-31 RX ORDER — VITAMIN B COMPLEX
2000 TABLET ORAL DAILY
Status: DISCONTINUED | OUTPATIENT
Start: 2021-10-31 | End: 2021-11-03 | Stop reason: HOSPADM

## 2021-10-31 RX ORDER — ALBUTEROL SULFATE 90 UG/1
2 AEROSOL, METERED RESPIRATORY (INHALATION) EVERY 4 HOURS PRN
Status: DISCONTINUED | OUTPATIENT
Start: 2021-10-31 | End: 2021-11-03 | Stop reason: HOSPADM

## 2021-10-31 RX ADMIN — IOPAMIDOL 80 ML: 755 INJECTION, SOLUTION INTRAVENOUS at 04:19

## 2021-10-31 RX ADMIN — HEPARIN SODIUM 18 UNITS/KG/HR: 10000 INJECTION, SOLUTION INTRAVENOUS at 06:33

## 2021-10-31 RX ADMIN — Medication 2000 UNITS: at 17:26

## 2021-10-31 RX ADMIN — CEFDINIR 300 MG: 300 CAPSULE ORAL at 20:58

## 2021-10-31 RX ADMIN — GUAIFENESIN 600 MG: 600 TABLET, EXTENDED RELEASE ORAL at 20:58

## 2021-10-31 RX ADMIN — OXYCODONE HYDROCHLORIDE AND ACETAMINOPHEN 1000 MG: 500 TABLET ORAL at 17:26

## 2021-10-31 RX ADMIN — HEPARIN SODIUM 6350 UNITS: 1000 INJECTION, SOLUTION INTRAVENOUS; SUBCUTANEOUS at 06:33

## 2021-10-31 RX ADMIN — ACETAMINOPHEN 650 MG: 325 TABLET ORAL at 03:45

## 2021-10-31 RX ADMIN — ACETAMINOPHEN 650 MG: 325 TABLET ORAL at 17:03

## 2021-10-31 RX ADMIN — SODIUM CHLORIDE 1000 ML: 9 INJECTION, SOLUTION INTRAVENOUS at 03:45

## 2021-10-31 RX ADMIN — Medication 50 MG: at 17:26

## 2021-10-31 RX ADMIN — HEPARIN SODIUM 3180 UNITS: 1000 INJECTION, SOLUTION INTRAVENOUS; SUBCUTANEOUS at 20:01

## 2021-10-31 RX ADMIN — CEFEPIME HYDROCHLORIDE 2000 MG: 2 INJECTION, POWDER, FOR SOLUTION INTRAVENOUS at 06:55

## 2021-10-31 RX ADMIN — SODIUM CHLORIDE: 9 INJECTION, SOLUTION INTRAVENOUS at 17:28

## 2021-10-31 RX ADMIN — AZITHROMYCIN MONOHYDRATE 500 MG: 500 INJECTION, POWDER, LYOPHILIZED, FOR SOLUTION INTRAVENOUS at 07:50

## 2021-10-31 ASSESSMENT — ENCOUNTER SYMPTOMS
ABDOMINAL DISTENTION: 0
STRIDOR: 0
WHEEZING: 0
ABDOMINAL PAIN: 0
BACK PAIN: 1
EYE REDNESS: 0
PHOTOPHOBIA: 0
EYE PAIN: 0
CONSTIPATION: 0
RHINORRHEA: 0
SORE THROAT: 0
SHORTNESS OF BREATH: 1
VOMITING: 0
CHEST TIGHTNESS: 0
COUGH: 1
NAUSEA: 1
DIARRHEA: 0
EYE ITCHING: 0
EYE DISCHARGE: 0

## 2021-10-31 ASSESSMENT — PAIN DESCRIPTION - LOCATION: LOCATION: RIB CAGE

## 2021-10-31 ASSESSMENT — PAIN SCALES - GENERAL
PAINLEVEL_OUTOF10: 6
PAINLEVEL_OUTOF10: 5
PAINLEVEL_OUTOF10: 6

## 2021-10-31 ASSESSMENT — PAIN DESCRIPTION - ONSET: ONSET: ON-GOING

## 2021-10-31 ASSESSMENT — PAIN DESCRIPTION - PAIN TYPE: TYPE: ACUTE PAIN

## 2021-10-31 ASSESSMENT — PAIN DESCRIPTION - ORIENTATION: ORIENTATION: RIGHT

## 2021-10-31 ASSESSMENT — PAIN DESCRIPTION - FREQUENCY: FREQUENCY: CONTINUOUS

## 2021-10-31 NOTE — ED NOTES
Pt appears to be resting comfortably on cot with eyes closed. No distress noted respirations even and unlabored. Call light remains in reach. Will monitor.      Nuha Louie RN  10/31/21 1020

## 2021-10-31 NOTE — ED TRIAGE NOTES
Pt presents to the ED via EMS with c/o positive for COVID and shortness of breath. Pt states he tested positive for COVID on 10/19. Pt states within the last two days he has had increased shortness of breath. Pt also reports having a cough that is causing RT sided rib cage pain.

## 2021-10-31 NOTE — ED NOTES
ED nurse-to-nurse bedside report    Chief Complaint   Patient presents with    Positive For Covid-19    Shortness of Breath      LOC: alert and orientated to name, place, date  Vital signs   Vitals:    10/31/21 0717 10/31/21 0825 10/31/21 1017 10/31/21 1130   BP: 107/68  103/71    Pulse:  84 83 83   Resp:  22 18 18   Temp:       TempSrc:       SpO2:  96% 95% 95%   Weight:       Height:          Pain:    Pain Interventions: see MAR  Pain Goal: 2/10  Oxygen: No    Current needs required AR   Telemetry: Yes  LDAs:   Peripheral IV 10/31/21 Left Antecubital (Active)   Site Assessment Clean;Dry; Intact 10/31/21 1025   Line Status Flushed 10/31/21 1025   Dressing Status Clean;Dry; Intact 10/31/21 1025   Dressing Intervention New 10/31/21 0308       Peripheral IV 10/31/21 Right Antecubital (Active)   Site Assessment Clean;Dry; Intact 10/31/21 1025   Line Status Infusing 10/31/21 1025   Dressing Status Clean;Dry; Intact 10/31/21 1025   Dressing Intervention New 10/31/21 0652     Continuous Infusions:    sodium chloride      heparin (PORCINE) Infusion 18 Units/kg/hr (10/31/21 1136)    sodium chloride       Mobility: Requires assistance * 1  Ramos Fall Risk Score: No flowsheet data found. Fall Interventions: side rails up x2, call light in place  Report given to:  Remigio Henderson RN  10/31/21 8034

## 2021-10-31 NOTE — ED NOTES
Bed: 009A  Expected date:   Expected time:   Means of arrival: [facility]  Comments:     Jill Gutiérrez RN  10/31/21 4183

## 2021-10-31 NOTE — ED NOTES
Upon bedside report, pt appears to be resting comfortably on cot. Pt was provided with fresh urinal at this time. No distress noted. Respirations even and unlabored. Call light in place. Pt denies any needs or concerns at this time.       Johnny Bond RN  10/31/21 2683

## 2021-10-31 NOTE — ED NOTES
RN called and updated pt sister Paul Call per pt request. RN informed Paul Call of visitation rules.       Gilberto Mosqueda RN  10/31/21 9329

## 2021-10-31 NOTE — ED PROVIDER NOTES
[address] ENCOUNTER      PATIENT NAME: Zoe Hernandez  MRN: [id number]  : 1960  CHING: 10/31/2021  PROVIDER: Terrance Chatterjee MD      [address] [place] [address]       Chief Complaint   Patient presents with    Positive For Covid-19    Shortness of Breath       Patient is seen and evaluated in a timely fashion. Nurses Notes are reviewed and I agree except as noted in the HPI. HISTORY OF PRESENT ILLNESS    Zoe Hernandez is a 59 y.o. male who presents to Emergency Department with Positive For Covid-19 and Shortness of Breath     67-year-old male presents to ED because of right-sided chest pain, fatigue, decreased appetite, and shortness of breath over last 2-3 days. Patient was tested positive for COVID-19 on 10/19/2021, admitted at [address] [place] [address] from 10/23-10/25. He states he was fine for several days then his overall situations deteriorate. This HPI was provided by patient. REVIEW OF SYSTEMS   Review of Systems   Constitutional: Positive for activity change, appetite change and fatigue. Negative for chills, fever and unexpected weight change. HENT: Negative for congestion, ear discharge, ear pain, hearing loss, nosebleeds, rhinorrhea and sore throat. Eyes: Negative for photophobia, pain, discharge, redness and itching. Respiratory: Positive for cough and shortness of breath. Negative for chest tightness, wheezing and stridor. Cardiovascular: Positive for chest pain. Negative for palpitations and leg swelling. Gastrointestinal: Positive for nausea. Negative for abdominal distention, abdominal pain, constipation, diarrhea and vomiting. Endocrine: Negative for cold intolerance, heat intolerance, polydipsia and polyphagia. Genitourinary: Negative for dysuria, flank pain, frequency and hematuria. Musculoskeletal: Positive for back pain and myalgias. Negative for arthralgias, gait problem, neck pain and neck stiffness. Skin: Negative for pallor, rash and wound. Allergic/Immunologic: Negative for environmental allergies and food allergies. Neurological: Positive for dizziness and weakness. Negative for tremors, syncope and headaches. Psychiatric/Behavioral: Negative for agitation, behavioral problems, confusion, self-injury, sleep disturbance and suicidal ideas. PAST MEDICAL HISTORY     Past Medical History:   Diagnosis Date    Nerve damage        SURGICAL HISTORY       Past Surgical History:   Procedure Laterality Date    HERNIA REPAIR      about 15 years ago       CURRENT MEDICATIONS       Previous Medications    ASCORBIC ACID (VITAMIN C) 1000 MG TABLET    Take 1 tablet by mouth daily    GABAPENTIN PO    Take by mouth    VITAMIN D (CHOLECALCIFEROL) 50 MCG (2000 UT) TABS TABLET    Take 1 tablet by mouth daily    ZINC SULFATE (ZINCATE) 220 (50 ZN) MG CAPSULE    Take 1 capsule by mouth daily       ALLERGIES     Patient has no known allergies. FAMILY HISTORY     has no family status information on file. family history is not on file. SOCIAL HISTORY      reports that he has never smoked. His smokeless tobacco use includes chew. He reports current alcohol use. He reports that he does not use drugs. PHYSICAL EXAM      height is 6' 1\" (1.854 m) and weight is 175 lb (79.4 kg). His rectal temperature is 99.2 °F (37.3 °C). His blood pressure is 112/69 and his pulse is 82. His respiration is 19 and oxygen saturation is 93%. Physical Exam  Vitals and nursing note reviewed. Constitutional:       Appearance: He is well-developed. He is ill-appearing. He is not diaphoretic. HENT:      Head: Normocephalic and atraumatic. Nose: Nose normal.   Eyes:      General: No scleral icterus. Right eye: No discharge. Left eye: No discharge. Conjunctiva/sclera: Conjunctivae normal.      Pupils: Pupils are equal, round, and reactive to light. Neck:      Vascular: No JVD. Trachea: No tracheal deviation.    Cardiovascular:      Rate and Rhythm: Normal rate and regular rhythm. Heart sounds: Normal heart sounds. No murmur heard. No friction rub. No gallop. Pulmonary:      Effort: Pulmonary effort is normal. No respiratory distress. Breath sounds: No stridor. Examination of the right-middle field reveals decreased breath sounds, rhonchi and rales. Examination of the right-lower field reveals decreased breath sounds, rhonchi and rales. Decreased breath sounds, rhonchi and rales present. No wheezing. Chest:      Chest wall: No tenderness. Abdominal:      General: Bowel sounds are normal. There is no distension. Palpations: Abdomen is soft. There is no mass. Tenderness: There is no abdominal tenderness. There is no guarding or rebound. Hernia: No hernia is present. Musculoskeletal:         General: No tenderness or deformity. Cervical back: Normal range of motion and neck supple. Lymphadenopathy:      Cervical: No cervical adenopathy. Skin:     General: Skin is warm and dry. Capillary Refill: Capillary refill takes less than 2 seconds. Coloration: Skin is not pale. Findings: No erythema or rash. Neurological:      Mental Status: He is alert and oriented to person, place, and time. Cranial Nerves: No cranial nerve deficit. Sensory: No sensory deficit. Motor: No abnormal muscle tone. Coordination: Coordination normal.      Deep Tendon Reflexes: Reflexes normal.   Psychiatric:         Behavior: Behavior normal.         Thought Content: Thought content normal.         Judgment: Judgment normal.         DIFFERETIAL DIAGNOSIS   Includes but not limited to: Pneumonia, bronchitis, CHF, PE, COPD, asthma, pulmonary edema, pleural effusion, AMI, URI, influenza, sinusitis, allergies, anxiety    ANCILLARY TEST RESULTS   EKG:    Interpreted by me  No acute changes  Ventricular Rate 93 BPM   Atrial Rate 93 BPM   P-R Interval 144 ms   QRS Duration 112 ms   Q-T Interval 360 ms   QTc Calculation (Bazett) 447 ms   P Axis 61 degrees   R Axis 41 degrees   T Axis 62 degrees       LAB RESULTS:  Results for orders placed or performed during the hospital encounter of 40/35/80   Basic Metabolic Panel w/ Reflex to MG   Result Value Ref Range    Sodium 130 (L) 135 - 145 meq/L    Potassium reflex Magnesium 4.7 3.5 - 5.2 meq/L    Chloride 95 (L) 98 - 111 meq/L    CO2 25 23 - 33 meq/L    Glucose 122 (H) 70 - 108 mg/dL    BUN 14 7 - 22 mg/dL    CREATININE 0.9 0.4 - 1.2 mg/dL    Calcium 8.8 8.5 - 10.5 mg/dL   CBC Auto Differential   Result Value Ref Range    WBC 14.3 (H) 4.8 - 10.8 thou/mm3    RBC 4.14 (L) 4.70 - 6.10 mill/mm3    Hemoglobin 13.0 (L) 14.0 - 18.0 gm/dl    Hematocrit 39.3 (L) 42.0 - 52.0 %    MCV 94.9 (H) 80.0 - 94.0 fL    MCH 31.4 26.0 - 33.0 pg    MCHC 33.1 32.2 - 35.5 gm/dl    RDW-CV 13.1 11.5 - 14.5 %    RDW-SD 45.9 (H) 35.0 - 45.0 fL    Platelets 919 445 - 071 thou/mm3    MPV 9.4 9.4 - 12.4 fL    Seg Neutrophils 88.0 %    Lymphocytes 3.0 %    Monocytes 9.0 %    Eosinophils 0.0 %    Basophils 0.0 %    Platelet Estimate ADEQUATE Adequate    Segs Absolute 12.6 (H) 1 - 7 thou/mm3    Lymphocytes Absolute 0.4 (L) 1.0 - 4.8 thou/mm3    Monocytes Absolute 1.3 0.4 - 1.3 thou/mm3    Eosinophils Absolute 0.0 0.0 - 0.4 thou/mm3    Basophils Absolute 0.0 0.0 - 0.1 thou/mm3    nRBC 0 /100 wbc   Troponin   Result Value Ref Range    Troponin T < 0.010 ng/ml   Hepatic function panel   Result Value Ref Range    Albumin 3.0 (L) 3.5 - 5.1 g/dL    Total Bilirubin 0.6 0.3 - 1.2 mg/dL    Bilirubin, Direct <0.2 0.0 - 0.3 mg/dL    Alkaline Phosphatase 124 38 - 126 U/L    AST 22 5 - 40 U/L    ALT 32 11 - 66 U/L    Total Protein 6.6 6.1 - 8.0 g/dL   Brain Natriuretic Peptide   Result Value Ref Range    Pro-.6 0.0 - 900.0 pg/mL   Lactic acid, plasma   Result Value Ref Range    Lactic Acid 0.6 0.5 - 2.0 mmol/L   Procalcitonin   Result Value Ref Range    Procalcitonin 0.29 (H) 0.01 - 0.09 ng/mL   Blood Gas, Arterial   Result Value Ref Range    pH, Blood Gas 7.41 7.35 - 7.45    PCO2 38 35 - 45 mmhg    PO2 72 71 - 104 mmhg    HCO3 24 23 - 28 mmol/l    Base Excess (Calculated) -0.4 -2.5 - 2.5 mmol/l    O2 Sat 95 %    DEVICE Room Air     COLLECTED BY: [id number]    Anion Gap   Result Value Ref Range    Anion Gap 10.0 8.0 - 16.0 meq/L   Glomerular Filtration Rate, Estimated   Result Value Ref Range    Est, Glom Filt Rate 85 (A) ml/min/1.73m2   Osmolality   Result Value Ref Range    Osmolality Calc 262.6 (L) 275.0 - 300.0 mOsmol/kg   Manual Differential   Result Value Ref Range    Differential, manual see below    EKG 12 Lead   Result Value Ref Range    Ventricular Rate 93 BPM    Atrial Rate 93 BPM    P-R Interval 144 ms    QRS Duration 112 ms    Q-T Interval 360 ms    QTc Calculation (Bazett) 447 ms    P Axis 61 degrees    R Axis 41 degrees    T Axis 62 degrees       RADIOLOGY REPORTS  CTA CHEST W WO CONTRAST   Final Result   Impression:   1. Large right lower lobe pulmonary arterial embolism. 2. Acute pneumonia, primary impact noted the right lower lobe. .      This document has been electronically signed by: Miracle García MD on    10/31/2021 05:17 AM      All CTs at this facility use dose modulation techniques and iterative    reconstructions, and/or weight-based dosing   when appropriate to reduce radiation to a low as reasonably achievable. XR CHEST PORTABLE   Final Result   Impression:   Worsening right basilar pneumonia.       This document has been electronically signed by: Miracle García MD on    10/31/2021 04:11 AM          ED [facility]     ED Vitals:  Vitals:    10/31/21 0258 10/31/21 0343 10/31/21 0521   BP: 115/74 111/79 112/69   Pulse: 93 94 82   Resp: 26 27 19   Temp: 98.4 °F (36.9 °C) 99.2 °F (37.3 °C)    TempSrc: Oral Rectal    SpO2: 94% 94% 93%   Weight: 175 lb (79.4 kg)     Height: 6' 1\" (1.854 m)         3:15 AM EDT   INITIAL ASSESSMENT AND PLAN  Large-bore IV  Normocytic bolus  Tylenol for fever  Labs: CBC, CMP, troponin, BNP, blood culture x2, lactic acid, procalcitonin  CTA chest    6:43 AM   REASSESSMENT AND PLAN  DISPOSITION Decision To Admit 10/31/2021 06:08:33 AM  Medications   0.9 % sodium chloride infusion (has no administration in time range)   cefepime (MAXIPIME) 2000 mg IVPB minibag (has no administration in time range)   azithromycin (ZITHROMAX) 500 mg in D5W 250ml addavial (has no administration in time range)   heparin (porcine) injection 6,350 Units (has no administration in time range)   heparin (porcine) injection 3,180 Units (has no administration in time range)   heparin 25,000 units in dextrose 5% 250 mL (premix) infusion (18 Units/kg/hr × 79.4 kg IntraVENous New Bag 10/31/21 0633)   0.9 % sodium chloride bolus (0 mLs IntraVENous Stopped 10/31/21 0520)   acetaminophen (TYLENOL) tablet 650 mg (650 mg Oral Given 10/31/21 0345)   iopamidol (ISOVUE-370) 76 % injection 80 mL (80 mLs IntraVENous Given 10/31/21 0419)   heparin (porcine) injection 6,350 Units (6,350 Units IntraVENous Given 10/31/21 8400)     Patient is ill-appearing, having positive right lung exam, this could be CAP versus Covid-19 pneumonia. Chest x-ray shows worsening right basilar pneumonia. Pertinent labs: WBC 14.3, sodium 130, troponin less than 0.01, , lactic acid 2.6, procalcitonin 0.29. ABG room air is normal.    CTA chest is ordered to rule out PE and check extent of right lung pneumonia. CTA chest shows large right lower lobe PE, acute pneumonia to right lower lobe. ED treatment include IV cefepime, Zithromax, and heparin drip. Admitted to Hospitalist service. CRITICAL CARE   None    CONSULTS   None    PROCEDURES   None    FINAL IMPRESSION AND DISPOSITION      1. Right pulmonary embolus ([address]    2. Pneumonia of right lower lobe due to infectious organism        Admitted    PATIENT REFERRED TO:  No follow-up provider specified.     DISCHARGE MEDICATIONS:  New Prescriptions    No medications on file       (Please note that portions of this note were completed with a voice recognition program.  Efforts were made to edit the dictations but occasionally words aremis-transcribed.)    MD Padmini Villagran MD  10/31/21 8902

## 2021-10-31 NOTE — H&P
Hospitalist - History & Physical      Patient: Heron Graham    Unit/Bed:09/009A  YOB: 1960  MRN: [id number]   Acct: [de-identified]   PCP: Enedina Sandhu MD    Date of Service: Pt seen/examined on 10/31/21  and Admitted to Inpatient with expected LOS greater than two midnights due to medical therapy. Chief Complaint:  R sided chest pain     Assessment and Plan:-  1. Acute right-sided pulmonary embolism: Patient presenting with right-sided chest pain. CTA shows large right lower lobe pulmonary arterial embolism. Likely provoked in setting of COVID-19 infection and reduced mobility. Patient started on heparin drip in the ED, will continue. [place] [facility] [place]. Pain control. 2. COVID-19 infection: Without hypoxia. Patient has been saturating in the mid 90s on room air. Continue to monitor for O2 needs. Resume vitamin C, D, zinc.  Incentive and Acapella ordered. Patient does not qualify for Decadron therapy or other treatment modalities as he is not hypoxic. Patient given Zithromax and Maxipime in the ED. PCT is elevated at 0.29. Patient looks uncomfortable, however nontoxic. Afebrile, though noted leukocytosis at 14.3. Will continue abx therapy at this time with suspicion for underlying bacterial PNA. 3. Hyponatremia: Sodium noted at 130, repeat BMP in a.m.  4. Acute normocytic anemia: Hemoglobin noted at 13, was 14 on 10/24. No gross bleeding identified. Continue to monitor and transfuse for hemoglobin less than 7 or hemodynamic instability. History Of Present Illness:    Patient is a 27-year-old male with past medical history of nerve damage and hernia repair who presents to [address] ED with chief complaint of right-sided chest pain. History is obtained via patient and chart review. Of note, patient was recently discharged from the Covid unit on 10/25/2021.     Patient reports that after discharge on 10/25 he returned home and was doing well for a couple of days, however starting on Friday he began to experience right-sided chest pain. Patient denies radiation of this pain into his jaw, arms, or into his back however he notes that it goes sometimes down below the knee in his right rib cage. Patient reports his pain is 7/10 in severity and that it is worse when he coughs or if he moves. Patient relates that upon arriving home from the hospital he was fairly active at first, however when he started to have this pain he reports that he was not moving very much at home. Patient denies any abdominal pain, nausea, vomiting, diarrhea. He admits to some intermittent dyspnea, specially with exertion. Admits to a dry cough that has greatly improved since his last admission. No further complaints noted at this time. In ED CTA showed a large right lower lobe pulmonary embolism in addition to acute pneumonia. Patient was started on heparin drip and hospitalist was contacted for admission. Past Medical History:        Diagnosis Date    Nerve damage        Past Surgical History:        Procedure Laterality Date    HERNIA REPAIR      about 15 years ago       Home Medications:   No current facility-administered medications on file prior to encounter. Current Outpatient Medications on File Prior to Encounter   Medication Sig Dispense Refill    ascorbic acid (VITAMIN C) 1000 MG tablet Take 1 tablet by mouth daily 30 tablet 3    Vitamin D (CHOLECALCIFEROL) 50 MCG (2000 UT) TABS tablet Take 1 tablet by mouth daily 60 tablet 0    zinc sulfate (ZINCATE) 220 (50 Zn) MG capsule Take 1 capsule by mouth daily 30 capsule 3    GABAPENTIN PO Take by mouth         Allergies:    Patient has no known allergies. Social History:    reports that he has never smoked. His smokeless tobacco use includes chew. He reports current alcohol use. He reports that he does not use drugs. Family History:   History reviewed. No pertinent family history. Diet:  ADULT DIET;  Regular    Review of systems:   Pertinent positives as noted in the HPI. All other systems reviewed and negative. PHYSICAL EXAM:  /71   Pulse 83   Temp 99.2 °F (37.3 °C) (Rectal)   Resp 18   Ht 6' 1\" (1.854 m)   Wt 175 lb (79.4 kg)   SpO2 95%   BMI 23.09 kg/m²   General appearance: Uncomfortable appearing, no apparent distress, appears stated age and cooperative. HEENT: Normal cephalic, atraumatic without obvious deformity. Pupils equal, round, and reactive to light. Extra ocular muscles intact. Conjunctivae/corneas clear. Neck: Supple, with full range of motion. No jugular venous distention. Trachea midline. Respiratory:  Normal respiratory effort. Clear to auscultation, bilaterally without Rales/Wheezes/Rhonchi. Cardiovascular: Regular rate and rhythm with normal S1/S2 without murmurs, rubs or gallops. Abdomen: Soft, non-tender, non-distended with normal bowel sounds. Musculoskeletal:  No clubbing, cyanosis or edema bilaterally. Skin: Skin color, texture, turgor normal.  No rashes or lesions. Neurologic:  Neurovascularly intact without any focal sensory/motor deficits. Cranial nerves: II-XII intact, grossly non-focal.  Psychiatric: Alert and oriented x4, thought content appropriate, normal insight  Capillary Refill: Brisk,< 3 seconds   Peripheral Pulses: +2 palpable, equal bilaterally     Labs:   Recent Labs     10/31/21  0310   WBC 14.3*   HGB 13.0*   HCT 39.3*        Recent Labs     10/31/21  0310   *   K 4.7   CL 95*   CO2 25   BUN 14   CREATININE 0.9   CALCIUM 8.8     Recent Labs     10/31/21  0310   AST 22   ALT 32   BILIDIR <0.2   BILITOT 0.6   ALKPHOS 124     No results for input(s): INR in the last 72 hours. No results for input(s): Allie Dross in the last 72 hours. Urinalysis:    No results found for: [place], BACTERIA, RBCUA, BLOODU, [address]    Radiology:   CTA CHEST W WO CONTRAST   Final Result   Impression:   1.  Large right lower lobe pulmonary arterial embolism. 2. Acute pneumonia, primary impact noted the right lower lobe. .      This document has been electronically signed by: Balta Galvez MD on    10/31/2021 05:17 AM      All CTs at this facility use dose modulation techniques and iterative    reconstructions, and/or weight-based dosing   when appropriate to reduce radiation to a low as reasonably achievable. XR CHEST PORTABLE   Final Result   Impression:   Worsening right basilar pneumonia. This document has been electronically signed by: Balta Galvez MD on    10/31/2021 04:11 AM        CTA CHEST W WO CONTRAST    Result Date: 10/31/2021  ** ADDENDUM #1 ** This report was discussed with Jerica Goncalves RN on Oct 31, 2021 05:24:00 EDT. This document has been electronically signed by: Gil Chen on 10/31/2021 05:25 AM ** ORIGINAL REPORT ** CTA chest Comparison: 10/14/2021 Findings: The right lower lobe pulmonary artery has a large embolism. There is some mild motion artifact in the left base and through the mid lungs obscuring visualization of smaller additional emboli. Over 50% of the right lower lobe contains dense infiltrate, mainly posteriorly. There is scattered infiltrate at the right upper lobe and middle lobe. Scattered atelectasis and infiltrate is also noted to the left lower lobe and lung base. Calcified left upper lobe nodules noted posterior laterally and inferiorly indicating granulomatous disease. The thoracic aorta has normal caliber. Included portions of the upper abdomen are without acute changes. Impression: 1. Large right lower lobe pulmonary arterial embolism. 2. Acute pneumonia, primary impact noted the right lower lobe. . This document has been electronically signed by: Balta Galvez MD on 10/31/2021 05:17 AM All CTs at this facility use dose modulation techniques and iterative reconstructions, and/or weight-based dosing when appropriate to reduce radiation to a low as reasonably achievable.     XR CHEST PORTABLE    Result Date: 10/31/2021  Single view chest Comparison: 10/23/2021 Findings: There is interval worsening of infiltrate at the right medial base. Scattered groundglass opacification and linear atelectasis is otherwise generally stable throughout both lungs. The heart size and pulmonary vascular are unchanged. Impression: Worsening right basilar pneumonia.  This document has been electronically signed by: Cheri Eric MD on 10/31/2021 04:11 AM        EKG: NSR, incomplete RBBB    Electronically signed by Naty Hartman PA-C on 10/31/2021 at 12:00 PM

## 2021-10-31 NOTE — ED NOTES
Pt appears to be resting comfortably on cot. hospitalist at bedside. Pt denies any needs or concerns at this time. Will monitor.       Virgilio Adame RN  10/31/21 2331

## 2021-10-31 NOTE — ED NOTES
ED nurse-to-nurse bedside report    Chief Complaint   Patient presents with    Positive For Covid-19    Shortness of Breath      LOC: alert and orientated to name, place, date  Vital signs   Vitals:    10/31/21 1612 10/31/21 1805 10/31/21 1808 10/31/21 1834   BP: 106/65  106/67    Pulse: 93  90 92   Resp: 16 17 20 20   Temp: 99.4 °F (37.4 °C)      TempSrc:       SpO2: 92%  94% 94%   Weight:       Height:          Pain:    Pain Interventions: tylenol  Pain Goal: none  Oxygen: No    Current needs required none   Telemetry: Yes  LDAs:   Peripheral IV 10/31/21 Left Antecubital (Active)   Site Assessment Clean;Dry; Intact 10/31/21 1025   Line Status Flushed 10/31/21 1025   Dressing Status Clean;Dry; Intact 10/31/21 1025   Dressing Intervention New 10/31/21 0308       Peripheral IV 10/31/21 Right Antecubital (Active)   Site Assessment Clean;Dry; Intact 10/31/21 1025   Line Status Infusing 10/31/21 1025   Dressing Status Clean;Dry; Intact 10/31/21 1025   Dressing Intervention New 10/31/21 0652     Continuous Infusions:    sodium chloride 100 mL/hr at 10/31/21 1728    heparin (PORCINE) Infusion 18 Units/kg/hr (10/31/21 1136)    sodium chloride       Mobility: Independent  Ramos Fall Risk Score: No flowsheet data found.   Fall Interventions: none  Report given to: Isabel Gan RN  10/31/21 2083

## 2021-10-31 NOTE — ED NOTES
Sister Mendel Emery ([phone]) called in to check on patient-She reports patient wife is intubated on 3A     Janell Talavera, RN  10/31/21 3988

## 2021-11-01 LAB
ANION GAP SERPL CALCULATED.3IONS-SCNC: 13 MEQ/L (ref 8–16)
BUN BLDV-MCNC: 18 MG/DL (ref 7–22)
CALCIUM SERPL-MCNC: 8.2 MG/DL (ref 8.5–10.5)
CHLORIDE BLD-SCNC: 100 MEQ/L (ref 98–111)
CO2: 22 MEQ/L (ref 23–33)
CREAT SERPL-MCNC: 0.9 MG/DL (ref 0.4–1.2)
D-DIMER QUANTITATIVE: 1706 NG/ML FEU (ref 0–500)
ERYTHROCYTE [DISTWIDTH] IN BLOOD BY AUTOMATED COUNT: 13.4 % (ref 11.5–14.5)
ERYTHROCYTE [DISTWIDTH] IN BLOOD BY AUTOMATED COUNT: 48.3 FL (ref 35–45)
GFR SERPL CREATININE-BSD FRML MDRD: 85 ML/MIN/1.73M2
GLUCOSE BLD-MCNC: 123 MG/DL (ref 70–108)
GLUCOSE BLD-MCNC: 87 MG/DL (ref 70–108)
GLUCOSE BLD-MCNC: 92 MG/DL (ref 70–108)
HCT VFR BLD CALC: 38.3 % (ref 42–52)
HEMOGLOBIN: 12.2 GM/DL (ref 14–18)
HEPARIN UNFRACTIONATED: 0.26 U/ML (ref 0.3–0.7)
HEPARIN UNFRACTIONATED: 0.35 U/ML (ref 0.3–0.7)
LV EF: 60 %
LVEF MODALITY: NORMAL
MCH RBC QN AUTO: 31.3 PG (ref 26–33)
MCHC RBC AUTO-ENTMCNC: 31.9 GM/DL (ref 32.2–35.5)
MCV RBC AUTO: 98.2 FL (ref 80–94)
MRSA SCREEN RT-PCR: NEGATIVE
PLATELET # BLD: 268 THOU/MM3 (ref 130–400)
PMV BLD AUTO: 10.4 FL (ref 9.4–12.4)
POTASSIUM SERPL-SCNC: 4.6 MEQ/L (ref 3.5–5.2)
RBC # BLD: 3.9 MILL/MM3 (ref 4.7–6.1)
SODIUM BLD-SCNC: 135 MEQ/L (ref 135–145)
WBC # BLD: 10.6 THOU/MM3 (ref 4.8–10.8)

## 2021-11-01 PROCEDURE — 96376 TX/PRO/DX INJ SAME DRUG ADON: CPT

## 2021-11-01 PROCEDURE — 6370000000 HC RX 637 (ALT 250 FOR IP): Performed by: PHYSICIAN ASSISTANT

## 2021-11-01 PROCEDURE — 96368 THER/DIAG CONCURRENT INF: CPT

## 2021-11-01 PROCEDURE — 82948 REAGENT STRIP/BLOOD GLUCOSE: CPT

## 2021-11-01 PROCEDURE — 85027 COMPLETE CBC AUTOMATED: CPT

## 2021-11-01 PROCEDURE — 6370000000 HC RX 637 (ALT 250 FOR IP): Performed by: INTERNAL MEDICINE

## 2021-11-01 PROCEDURE — 93306 TTE W/DOPPLER COMPLETE: CPT

## 2021-11-01 PROCEDURE — 85520 HEPARIN ASSAY: CPT

## 2021-11-01 PROCEDURE — 87449 NOS EACH ORGANISM AG IA: CPT

## 2021-11-01 PROCEDURE — 80048 BASIC METABOLIC PNL TOTAL CA: CPT

## 2021-11-01 PROCEDURE — 6360000002 HC RX W HCPCS: Performed by: INTERNAL MEDICINE

## 2021-11-01 PROCEDURE — 96366 THER/PROPH/DIAG IV INF ADDON: CPT

## 2021-11-01 PROCEDURE — G0378 HOSPITAL OBSERVATION PER HR: HCPCS

## 2021-11-01 PROCEDURE — 96372 THER/PROPH/DIAG INJ SC/IM: CPT

## 2021-11-01 PROCEDURE — 1200000003 HC TELEMETRY R&B

## 2021-11-01 PROCEDURE — 6360000002 HC RX W HCPCS: Performed by: PHYSICIAN ASSISTANT

## 2021-11-01 PROCEDURE — 2580000003 HC RX 258: Performed by: INTERNAL MEDICINE

## 2021-11-01 PROCEDURE — 2580000003 HC RX 258: Performed by: PHYSICIAN ASSISTANT

## 2021-11-01 PROCEDURE — 99233 SBSQ HOSP IP/OBS HIGH 50: CPT | Performed by: INTERNAL MEDICINE

## 2021-11-01 PROCEDURE — 36415 COLL VENOUS BLD VENIPUNCTURE: CPT

## 2021-11-01 PROCEDURE — 85379 FIBRIN DEGRADATION QUANT: CPT

## 2021-11-01 PROCEDURE — 87899 AGENT NOS ASSAY W/OPTIC: CPT

## 2021-11-01 PROCEDURE — 87641 MR-STAPH DNA AMP PROBE: CPT

## 2021-11-01 RX ORDER — HYDROCODONE BITARTRATE AND ACETAMINOPHEN 5; 325 MG/1; MG/1
1 TABLET ORAL EVERY 6 HOURS PRN
Status: DISCONTINUED | OUTPATIENT
Start: 2021-11-01 | End: 2021-11-03 | Stop reason: HOSPADM

## 2021-11-01 RX ADMIN — ENOXAPARIN SODIUM 80 MG: 100 INJECTION SUBCUTANEOUS at 00:02

## 2021-11-01 RX ADMIN — Medication 50 MG: at 09:38

## 2021-11-01 RX ADMIN — GABAPENTIN 600 MG: 600 TABLET ORAL at 09:42

## 2021-11-01 RX ADMIN — HYDROCODONE BITARTRATE AND ACETAMINOPHEN 1 TABLET: 5; 325 TABLET ORAL at 16:25

## 2021-11-01 RX ADMIN — ENOXAPARIN SODIUM 80 MG: 100 INJECTION SUBCUTANEOUS at 14:57

## 2021-11-01 RX ADMIN — GUAIFENESIN 600 MG: 600 TABLET, EXTENDED RELEASE ORAL at 09:38

## 2021-11-01 RX ADMIN — GUAIFENESIN 600 MG: 600 TABLET, EXTENDED RELEASE ORAL at 21:16

## 2021-11-01 RX ADMIN — OXYCODONE HYDROCHLORIDE AND ACETAMINOPHEN 1000 MG: 500 TABLET ORAL at 09:38

## 2021-11-01 RX ADMIN — PIPERACILLIN AND TAZOBACTAM 3375 MG: 3; .375 INJECTION, POWDER, LYOPHILIZED, FOR SOLUTION INTRAVENOUS at 21:17

## 2021-11-01 RX ADMIN — HEPARIN SODIUM 22 UNITS/KG/HR: 10000 INJECTION, SOLUTION INTRAVENOUS at 11:55

## 2021-11-01 RX ADMIN — SODIUM CHLORIDE: 9 INJECTION, SOLUTION INTRAVENOUS at 01:43

## 2021-11-01 RX ADMIN — HYDROCODONE BITARTRATE AND ACETAMINOPHEN 1 TABLET: 5; 325 TABLET ORAL at 23:59

## 2021-11-01 RX ADMIN — PIPERACILLIN AND TAZOBACTAM 3375 MG: 3; .375 INJECTION, POWDER, LYOPHILIZED, FOR SOLUTION INTRAVENOUS at 11:54

## 2021-11-01 RX ADMIN — CEFDINIR 300 MG: 300 CAPSULE ORAL at 09:38

## 2021-11-01 RX ADMIN — HEPARIN SODIUM 22 UNITS/KG/HR: 10000 INJECTION, SOLUTION INTRAVENOUS at 01:41

## 2021-11-01 RX ADMIN — AZITHROMYCIN MONOHYDRATE 250 MG: 250 TABLET ORAL at 09:38

## 2021-11-01 RX ADMIN — HEPARIN SODIUM 3180 UNITS: 1000 INJECTION, SOLUTION INTRAVENOUS; SUBCUTANEOUS at 01:41

## 2021-11-01 RX ADMIN — Medication 2000 UNITS: at 09:38

## 2021-11-01 RX ADMIN — GABAPENTIN 600 MG: 600 TABLET ORAL at 21:17

## 2021-11-01 RX ADMIN — Medication 1 CAPSULE: at 09:38

## 2021-11-01 ASSESSMENT — PAIN DESCRIPTION - LOCATION: LOCATION: RIB CAGE

## 2021-11-01 ASSESSMENT — PAIN DESCRIPTION - ORIENTATION: ORIENTATION: RIGHT

## 2021-11-01 ASSESSMENT — PAIN DESCRIPTION - PAIN TYPE: TYPE: ACUTE PAIN

## 2021-11-01 ASSESSMENT — PAIN DESCRIPTION - PROGRESSION: CLINICAL_PROGRESSION: NOT CHANGED

## 2021-11-01 ASSESSMENT — PAIN SCALES - GENERAL
PAINLEVEL_OUTOF10: 4
PAINLEVEL_OUTOF10: 3
PAINLEVEL_OUTOF10: 4
PAINLEVEL_OUTOF10: 3

## 2021-11-01 ASSESSMENT — PAIN DESCRIPTION - DESCRIPTORS: DESCRIPTORS: TIGHTNESS

## 2021-11-01 ASSESSMENT — PAIN DESCRIPTION - ONSET: ONSET: ON-GOING

## 2021-11-01 ASSESSMENT — PAIN DESCRIPTION - FREQUENCY: FREQUENCY: CONTINUOUS

## 2021-11-01 ASSESSMENT — PAIN - FUNCTIONAL ASSESSMENT: PAIN_FUNCTIONAL_ASSESSMENT: PREVENTS OR INTERFERES WITH MANY ACTIVE NOT PASSIVE ACTIVITIES

## 2021-11-01 NOTE — PROGRESS NOTES
Patient has done acapella since he has been here, refused to do at this time I did encourage him to work on every couple hours

## 2021-11-01 NOTE — PROGRESS NOTES
Hospitalist Progress Note    Patient:  Kecia Guzman    YOB: 1960  Unit/Bed:6K-09/009-A  MRN: [id number]    Acct: [de-identified]   PCP: Anjel Landry MD    Date of Admission: 10/31/2021      Assessment/Plan:  Milo Brantley Acute PE, likely provoked by recent Covid infection: transition from heparin to lovenox subq. Non massive, no hypotension, trop elevation, or e/o rt heart strain. o Transition to Xarelto vs Eliquis 11/2 after d/w patient  o Continue LMWH for now.   o Recommend at least 3-6 months treatment. o Consider O2 eval on DC to determine if any exertional needs. o No calf pain or LE edema.  Covid 19 pneumonia: recent hospitalization with hypoxia and was treated with decadron and baricitinib, however dc'ed on room air thus these were stopped  o No ongoing hypoxia, therefore no ongoing need for immunomodulator/steroid therapy  o Symptomatic management, IS/Acapella.  Suspected Bacterial Pneumonia: given recent hospitalization, will cover with Zosyn. Received 1x dose Cefepime and Azithro in ED  o Continue Zosyn, Azithro  o Follow cultures. o MRSA nares negative.  Mild Hyponatremia: resolved, with IVF. Will stop fluids tonight.  Chewing tobacco use: counseling. Expected discharge date:  1-2 days    Disposition: pending recovery  [] Home  [] TCU  [] Rehab  [] Psych  [] SNF  [] [facility]  [] Other-    ===================================================================      Chief Complaint: SOB    Hospital Course: Patient with recent hospitalization and discharge for covid pneumonia. Presents with SOB and found to have acute right sided PE. Started on heparin drip and admitted to hospitalist service. There was no hypoxia or other hemodynamic instability. Subjective (past 24 hours): Patient seen at bedside. He reports pain, in the right lung, worse with coughing and deep breathing. No hemoptysis or chest pressure. No dizziness. Reports dyspnea at exertion. No calf pain. Medications:  Reviewed    Infusion Medications    sodium chloride 100 mL/hr at 11/01/21 0143    sodium chloride       Scheduled Medications    piperacillin-tazobactam  3,375 mg IntraVENous Q8H    enoxaparin  1 mg/kg SubCUTAneous Q12H    sodium chloride flush  5-40 mL IntraVENous 2 times per day    ascorbic acid  1,000 mg Oral Daily    zinc sulfate  50 mg Oral Daily    Vitamin D  2,000 Units Oral Daily    guaiFENesin  600 mg Oral BID    azithromycin  250 mg Oral Daily    lactobacillus  1 capsule Oral Daily with breakfast    gabapentin  600 mg Oral BID     PRN Meds: HYDROcodone 5 mg - acetaminophen, sodium chloride flush, sodium chloride, ondansetron **OR** ondansetron, polyethylene glycol, acetaminophen **OR** acetaminophen, albuterol sulfate HFA      ROS: reviewed from prior note, full ROS unchanged unless otherwise stated in hospital course/subjective portion. No intake or output data in the 24 hours ending 11/01/21 1807    Exam:  /78   Pulse 91   Temp 98.1 °F (36.7 °C) (Oral)   Resp 20   Ht 6' 1\" (1.854 m)   Wt 175 lb (79.4 kg)   SpO2 94%   BMI 23.09 kg/m²     General appearance: No distress, well developed, appears stated age. Ill appearing. Eyes:  PERRL. Conjunctivae/corneas clear. HENT: Head normal appearing. Nares normal. Oral mucosa moist.  Hearing intact. Neck: Supple, with full range of motion. Trachea midline. No gross JVD appreciated. Respiratory:  Normal effort. Clear to auscultation, without rales or wheezes or rhonchi. Diminished overall. Cardiovascular: Normal rate, regular rhythm with normal S1/S2 without murmurs. No lower extremity edema. Abdomen: Soft, non-tender, non-distended with normal bowel sounds. Musculoskeletal: No joint swelling or tenderness. Normal tone. No abnormal movements. Skin: Warm and dry. No rashes or lesions. Neurologic:  No focal sensory/motor deficits in the upper or lower extremities.  Cranial nerves: grossly non-focal 2-12. Psychiatric: Alert and oriented, normal insight and thought content. Capillary Refill: Brisk,< 3 seconds. Peripheral Pulses: +2 palpable, equal bilaterally. Labs:   Recent Labs     10/31/21  0310 11/01/21  0648   WBC 14.3* 10.6   HGB 13.0* 12.2*   HCT 39.3* 38.3*    268     Recent Labs     10/31/21  0310 11/01/21  0648   * 135   K 4.7 4.6   CL 95* 100   CO2 25 22*   BUN 14 18   CREATININE 0.9 0.9   CALCIUM 8.8 8.2*     Recent Labs     10/31/21  0310   AST 22   ALT 32   BILIDIR <0.2   BILITOT 0.6   ALKPHOS 124     No results for input(s): INR in the last 72 hours. No results for input(s): Wayna Khat in the last 72 hours. Recent Labs     10/31/21  0310   PROCAL 0.29*    No results found for: Jerolyn Cowboy, BACTERIA, RBCUA, BLOODU, SPECGRAV, GLUCOSEU    Radiology (48 hours):  CTA CHEST W WO CONTRAST    Result Date: 10/31/2021  Impression: 1. Large right lower lobe pulmonary arterial embolism. 2. Acute pneumonia, primary impact noted the right lower lobe. . This document has been electronically signed by: Dakota Carcamo MD on 10/31/2021 05:17 AM All CTs at this facility use dose modulation techniques and iterative reconstructions, and/or weight-based dosing when appropriate to reduce radiation to a low as reasonably achievable. XR CHEST PORTABLE    Result Date: 10/31/2021  Impression: Worsening right basilar pneumonia. This document has been electronically signed by: Dakota Carcamo MD on 10/31/2021 04:11 AM       DVT prophylaxis:    [] Lovenox  [] SCDs  [] SQ Heparin  [] Encourage ambulation   [] Already on Anticoagulation       Diet: ADULT DIET;  Regular  Code Status: Full Code  PT/OT: yes  Tele:   IVF: will stop    Electronically signed by Adriana Goodman DO on 11/1/2021 at 6:07 PM

## 2021-11-02 LAB
ANION GAP SERPL CALCULATED.3IONS-SCNC: 10 MEQ/L (ref 8–16)
BUN BLDV-MCNC: 16 MG/DL (ref 7–22)
CALCIUM SERPL-MCNC: 8.4 MG/DL (ref 8.5–10.5)
CHLORIDE BLD-SCNC: 100 MEQ/L (ref 98–111)
CO2: 26 MEQ/L (ref 23–33)
CREAT SERPL-MCNC: 1 MG/DL (ref 0.4–1.2)
D-DIMER QUANTITATIVE: 1932 NG/ML FEU (ref 0–500)
GFR SERPL CREATININE-BSD FRML MDRD: 75 ML/MIN/1.73M2
GLUCOSE BLD-MCNC: 108 MG/DL (ref 70–108)
GLUCOSE BLD-MCNC: 127 MG/DL (ref 70–108)
GLUCOSE BLD-MCNC: 92 MG/DL (ref 70–108)
LEGIONELLA PNEUMOPHILIA AG, URINE: NEGATIVE
POTASSIUM REFLEX MAGNESIUM: 4.4 MEQ/L (ref 3.5–5.2)
PROCALCITONIN: 0.25 NG/ML (ref 0.01–0.09)
SODIUM BLD-SCNC: 136 MEQ/L (ref 135–145)
STREP PNEUMO AG, UR: NEGATIVE

## 2021-11-02 PROCEDURE — 80048 BASIC METABOLIC PNL TOTAL CA: CPT

## 2021-11-02 PROCEDURE — 6360000002 HC RX W HCPCS: Performed by: INTERNAL MEDICINE

## 2021-11-02 PROCEDURE — 6370000000 HC RX 637 (ALT 250 FOR IP): Performed by: INTERNAL MEDICINE

## 2021-11-02 PROCEDURE — 36415 COLL VENOUS BLD VENIPUNCTURE: CPT

## 2021-11-02 PROCEDURE — 2580000003 HC RX 258: Performed by: INTERNAL MEDICINE

## 2021-11-02 PROCEDURE — G0378 HOSPITAL OBSERVATION PER HR: HCPCS

## 2021-11-02 PROCEDURE — 6370000000 HC RX 637 (ALT 250 FOR IP): Performed by: PHYSICIAN ASSISTANT

## 2021-11-02 PROCEDURE — 84145 PROCALCITONIN (PCT): CPT

## 2021-11-02 PROCEDURE — 99233 SBSQ HOSP IP/OBS HIGH 50: CPT | Performed by: INTERNAL MEDICINE

## 2021-11-02 PROCEDURE — 1200000003 HC TELEMETRY R&B

## 2021-11-02 PROCEDURE — 96366 THER/PROPH/DIAG IV INF ADDON: CPT

## 2021-11-02 PROCEDURE — 82948 REAGENT STRIP/BLOOD GLUCOSE: CPT

## 2021-11-02 PROCEDURE — 85379 FIBRIN DEGRADATION QUANT: CPT

## 2021-11-02 RX ADMIN — Medication 1 CAPSULE: at 08:06

## 2021-11-02 RX ADMIN — Medication 2000 UNITS: at 08:06

## 2021-11-02 RX ADMIN — PIPERACILLIN AND TAZOBACTAM 3375 MG: 3; .375 INJECTION, POWDER, LYOPHILIZED, FOR SOLUTION INTRAVENOUS at 11:22

## 2021-11-02 RX ADMIN — APIXABAN 10 MG: 5 TABLET, FILM COATED ORAL at 20:16

## 2021-11-02 RX ADMIN — GUAIFENESIN 600 MG: 600 TABLET, EXTENDED RELEASE ORAL at 08:06

## 2021-11-02 RX ADMIN — PIPERACILLIN AND TAZOBACTAM 3375 MG: 3; .375 INJECTION, POWDER, LYOPHILIZED, FOR SOLUTION INTRAVENOUS at 18:49

## 2021-11-02 RX ADMIN — Medication 50 MG: at 08:06

## 2021-11-02 RX ADMIN — GABAPENTIN 600 MG: 600 TABLET ORAL at 08:27

## 2021-11-02 RX ADMIN — OXYCODONE HYDROCHLORIDE AND ACETAMINOPHEN 1000 MG: 500 TABLET ORAL at 08:06

## 2021-11-02 RX ADMIN — GABAPENTIN 600 MG: 600 TABLET ORAL at 20:17

## 2021-11-02 RX ADMIN — GUAIFENESIN 600 MG: 600 TABLET, EXTENDED RELEASE ORAL at 20:16

## 2021-11-02 RX ADMIN — PIPERACILLIN AND TAZOBACTAM 3375 MG: 3; .375 INJECTION, POWDER, LYOPHILIZED, FOR SOLUTION INTRAVENOUS at 03:54

## 2021-11-02 RX ADMIN — APIXABAN 10 MG: 5 TABLET, FILM COATED ORAL at 09:53

## 2021-11-02 RX ADMIN — AZITHROMYCIN MONOHYDRATE 250 MG: 250 TABLET ORAL at 08:06

## 2021-11-02 ASSESSMENT — PAIN SCALES - GENERAL
PAINLEVEL_OUTOF10: 0
PAINLEVEL_OUTOF10: 3

## 2021-11-02 NOTE — PROGRESS NOTES
Hospitalist Progress Note    Patient:  Mari Ma    YOB: 1960  Unit/Bed:6K-09/009-A  MRN: [id number]    Acct: [de-identified]   PCP: José Landaverde MD    Date of Admission: 10/31/2021      Assessment/Plan:  Becka Parisi Acute PE, likely provoked by recent Covid infection: transition from heparin to lovenox subq. Non massive, no hypotension, trop elevation, or e/o rt heart strain. o Transition to Eliquis 11/2 after d/w patient   S/p LMWH  o Recommend at least 3-6 months treatment. o Consider O2 eval on DC to determine if any exertional needs. o No calf pain or LE edema. o PT/OT   Covid 19 pneumonia: recent hospitalization with hypoxia and was treated with decadron and baricitinib, however dc'ed on room air thus these were stopped  o No ongoing hypoxia, therefore no ongoing need for immunomodulator/steroid therapy  o Symptomatic management, IS/Acapella.   o OK to be out of isolation today 11/2 per infection control   Suspected Bacterial Pneumonia: given recent hospitalization, will cover with Zosyn. MRSA nares negative. Received 1x dose Cefepime and Azithro in ED  o Continue Zosyn - can transition to PO option likely tomorrow, Azithro x5d  o Follow cultures, legionella, strep.  Mild Hyponatremia: resolved, with IVF. Completed IVF.  Chewing tobacco use: counseling.  Physical Deconditioning: due to recent covid, and current pneumonia and PE. PT and OT are consulted. Expected discharge date:  1 days    Disposition: pending recovery - patient has declined [facility] and SNF per my discussion. He has sisters that can help take care of him. [] Home  [] TCU  [] Rehab  [] Psych  [] SNF  [] [facility]  [] Other-    ===================================================================      Chief Complaint: SOB    Hospital Course: Patient with recent hospitalization and discharge for covid pneumonia. Presents with SOB and found to have acute right sided PE.   Started on heparin drip and admitted to hospitalist service. There was no hypoxia or other hemodynamic instability. Subjective (past 24 hours): Patient seen at bedside. Pain is improved, still some with cough but now just feels difficult to draw full breath. No fevers reported, minimal sputum. He still endorses dyspnea with walking few feet. Medications:  Reviewed    Infusion Medications    sodium chloride       Scheduled Medications    apixaban  10 mg Oral BID    piperacillin-tazobactam  3,375 mg IntraVENous Q8H    sodium chloride flush  5-40 mL IntraVENous 2 times per day    ascorbic acid  1,000 mg Oral Daily    zinc sulfate  50 mg Oral Daily    Vitamin D  2,000 Units Oral Daily    guaiFENesin  600 mg Oral BID    azithromycin  250 mg Oral Daily    lactobacillus  1 capsule Oral Daily with breakfast    gabapentin  600 mg Oral BID     PRN Meds: HYDROcodone 5 mg - acetaminophen, sodium chloride flush, sodium chloride, ondansetron **OR** ondansetron, polyethylene glycol, acetaminophen **OR** acetaminophen, albuterol sulfate HFA      ROS: reviewed from prior note, full ROS unchanged unless otherwise stated in hospital course/subjective portion. Intake/Output Summary (Last 24 hours) at 11/2/2021 1553  Last data filed at 11/2/2021 0113  Gross per 24 hour   Intake 3805.11 ml   Output 750 ml   Net 3055.11 ml       Exam:  /76   Pulse 88   Temp 99 °F (37.2 °C)   Resp 20   Ht 6' 1\" (1.854 m)   Wt 175 lb (79.4 kg)   SpO2 95%   BMI 23.09 kg/m²     General appearance: Mild distress, well developed, appears stated age. Acutely Ill appearing. Eyes:  PERRL. Conjunctivae/corneas clear. HENT: Head normal appearing. Nares normal. Oral mucosa moist.  Hearing intact. Neck: Supple, with full range of motion. Trachea midline. No gross JVD appreciated. Respiratory:  Normal effort. Clear to auscultation, without rales or wheezes or rhonchi. Diminished overall, however moreso on the right base/midlung.  Shallow respiratory effort. Cardiovascular: Normal rate, regular rhythm with normal S1/S2 without murmurs. No lower extremity edema. Abdomen: Soft, non-tender, non-distended with normal bowel sounds. Musculoskeletal: No joint swelling or tenderness. Normal tone. No abnormal movements. Skin: Warm and dry. No rashes or lesions. Neurologic:  No focal sensory/motor deficits in the upper or lower extremities. Cranial nerves:  grossly non-focal 2-12. Generalized weakness  Psychiatric: Alert and oriented, normal insight and thought content. Capillary Refill: Brisk,< 3 seconds. Peripheral Pulses: +2 palpable, equal bilaterally. Labs:   Recent Labs     10/31/21  0310 11/01/21  0648   WBC 14.3* 10.6   HGB 13.0* 12.2*   HCT 39.3* 38.3*    268     Recent Labs     10/31/21  0310 11/01/21  0648 11/02/21  0904   * 135 136   K 4.7 4.6 4.4   CL 95* 100 100   CO2 25 22* 26   BUN 14 18 16   CREATININE 0.9 0.9 1.0   CALCIUM 8.8 8.2* 8.4*     Recent Labs     10/31/21  0310   AST 22   ALT 32   BILIDIR <0.2   BILITOT 0.6   ALKPHOS 124     No results for input(s): INR in the last 72 hours. No results for input(s): Cherre Gash in the last 72 hours. Recent Labs     10/31/21  0310 11/02/21  0904   PROCAL 0.29* 0.25*    No results found for: Eual Friendly, BACTERIA, RBCUA, BLOODU, SPECGRAV, GLUCOSEU    Radiology (48 hours):  CTA CHEST W WO CONTRAST    Result Date: 10/31/2021  Impression: 1. Large right lower lobe pulmonary arterial embolism. 2. Acute pneumonia, primary impact noted the right lower lobe. . This document has been electronically signed by: Dilan Alba MD on 10/31/2021 05:17 AM All CTs at this facility use dose modulation techniques and iterative reconstructions, and/or weight-based dosing when appropriate to reduce radiation to a low as reasonably achievable. XR CHEST PORTABLE    Result Date: 10/31/2021  Impression: Worsening right basilar pneumonia.  This document has been electronically signed by: Александр Nettles MD on 10/31/2021 04:11 AM       DVT prophylaxis:    [] Lovenox  [] SCDs  [] SQ Heparin  [] Encourage ambulation   [x] Already on Anticoagulation       Diet: ADULT DIET;  Regular  Code Status: Full Code  PT/OT: yes  Tele:   IVF: will stop    Electronically signed by Paulie Anderson DO on 11/2/2021 at 3:53 PM

## 2021-11-03 VITALS
DIASTOLIC BLOOD PRESSURE: 73 MMHG | HEIGHT: 73 IN | OXYGEN SATURATION: 94 % | WEIGHT: 169.2 LBS | HEART RATE: 86 BPM | BODY MASS INDEX: 22.43 KG/M2 | RESPIRATION RATE: 18 BRPM | TEMPERATURE: 98 F | SYSTOLIC BLOOD PRESSURE: 109 MMHG

## 2021-11-03 LAB
ERYTHROCYTE [DISTWIDTH] IN BLOOD BY AUTOMATED COUNT: 13.6 % (ref 11.5–14.5)
ERYTHROCYTE [DISTWIDTH] IN BLOOD BY AUTOMATED COUNT: 48.7 FL (ref 35–45)
GLUCOSE BLD-MCNC: 100 MG/DL (ref 70–108)
GLUCOSE BLD-MCNC: 98 MG/DL (ref 70–108)
HCT VFR BLD CALC: 36 % (ref 42–52)
HEMOGLOBIN: 11.5 GM/DL (ref 14–18)
MCH RBC QN AUTO: 30.9 PG (ref 26–33)
MCHC RBC AUTO-ENTMCNC: 31.9 GM/DL (ref 32.2–35.5)
MCV RBC AUTO: 96.8 FL (ref 80–94)
PLATELET # BLD: 267 THOU/MM3 (ref 130–400)
PMV BLD AUTO: 9.4 FL (ref 9.4–12.4)
RBC # BLD: 3.72 MILL/MM3 (ref 4.7–6.1)
WBC # BLD: 7.2 THOU/MM3 (ref 4.8–10.8)

## 2021-11-03 PROCEDURE — 36415 COLL VENOUS BLD VENIPUNCTURE: CPT

## 2021-11-03 PROCEDURE — 99233 SBSQ HOSP IP/OBS HIGH 50: CPT | Performed by: INTERNAL MEDICINE

## 2021-11-03 PROCEDURE — 97116 GAIT TRAINING THERAPY: CPT

## 2021-11-03 PROCEDURE — 97530 THERAPEUTIC ACTIVITIES: CPT

## 2021-11-03 PROCEDURE — 82948 REAGENT STRIP/BLOOD GLUCOSE: CPT

## 2021-11-03 PROCEDURE — 6360000002 HC RX W HCPCS: Performed by: INTERNAL MEDICINE

## 2021-11-03 PROCEDURE — 97535 SELF CARE MNGMENT TRAINING: CPT

## 2021-11-03 PROCEDURE — 96366 THER/PROPH/DIAG IV INF ADDON: CPT

## 2021-11-03 PROCEDURE — 97162 PT EVAL MOD COMPLEX 30 MIN: CPT

## 2021-11-03 PROCEDURE — 97165 OT EVAL LOW COMPLEX 30 MIN: CPT

## 2021-11-03 PROCEDURE — G0378 HOSPITAL OBSERVATION PER HR: HCPCS

## 2021-11-03 PROCEDURE — 85027 COMPLETE CBC AUTOMATED: CPT

## 2021-11-03 PROCEDURE — 2580000003 HC RX 258: Performed by: INTERNAL MEDICINE

## 2021-11-03 PROCEDURE — 6370000000 HC RX 637 (ALT 250 FOR IP): Performed by: INTERNAL MEDICINE

## 2021-11-03 PROCEDURE — 2580000003 HC RX 258: Performed by: PHYSICIAN ASSISTANT

## 2021-11-03 PROCEDURE — 6370000000 HC RX 637 (ALT 250 FOR IP): Performed by: PHYSICIAN ASSISTANT

## 2021-11-03 RX ORDER — GUAIFENESIN 600 MG/1
600 TABLET, EXTENDED RELEASE ORAL 2 TIMES DAILY
Qty: 60 TABLET | Refills: 0 | Status: SHIPPED | OUTPATIENT
Start: 2021-11-03

## 2021-11-03 RX ORDER — ALBUTEROL SULFATE 90 UG/1
2 AEROSOL, METERED RESPIRATORY (INHALATION) EVERY 4 HOURS PRN
Qty: 18 G | Refills: 3 | Status: SHIPPED | OUTPATIENT
Start: 2021-11-03

## 2021-11-03 RX ORDER — HYDROCODONE BITARTRATE AND ACETAMINOPHEN 5; 325 MG/1; MG/1
1 TABLET ORAL EVERY 6 HOURS PRN
Qty: 15 TABLET | Refills: 0 | Status: SHIPPED | OUTPATIENT
Start: 2021-11-03 | End: 2021-11-06

## 2021-11-03 RX ORDER — LACTOBACILLUS RHAMNOSUS GG 10B CELL
1 CAPSULE ORAL
Qty: 30 CAPSULE | Refills: 0 | Status: SHIPPED | OUTPATIENT
Start: 2021-11-04

## 2021-11-03 RX ORDER — AZITHROMYCIN 250 MG/1
250 TABLET, FILM COATED ORAL DAILY
Qty: 1 TABLET | Refills: 0 | Status: SHIPPED | OUTPATIENT
Start: 2021-11-04 | End: 2021-11-05

## 2021-11-03 RX ADMIN — PIPERACILLIN AND TAZOBACTAM 3375 MG: 3; .375 INJECTION, POWDER, LYOPHILIZED, FOR SOLUTION INTRAVENOUS at 02:51

## 2021-11-03 RX ADMIN — Medication 1 CAPSULE: at 09:23

## 2021-11-03 RX ADMIN — PIPERACILLIN AND TAZOBACTAM 3375 MG: 3; .375 INJECTION, POWDER, LYOPHILIZED, FOR SOLUTION INTRAVENOUS at 10:51

## 2021-11-03 RX ADMIN — Medication 50 MG: at 09:23

## 2021-11-03 RX ADMIN — GUAIFENESIN 600 MG: 600 TABLET, EXTENDED RELEASE ORAL at 09:23

## 2021-11-03 RX ADMIN — APIXABAN 10 MG: 5 TABLET, FILM COATED ORAL at 09:23

## 2021-11-03 RX ADMIN — GABAPENTIN 600 MG: 600 TABLET ORAL at 09:34

## 2021-11-03 RX ADMIN — AZITHROMYCIN MONOHYDRATE 250 MG: 250 TABLET ORAL at 09:23

## 2021-11-03 RX ADMIN — Medication 2000 UNITS: at 09:23

## 2021-11-03 RX ADMIN — OXYCODONE HYDROCHLORIDE AND ACETAMINOPHEN 1000 MG: 500 TABLET ORAL at 09:23

## 2021-11-03 RX ADMIN — SODIUM CHLORIDE, PRESERVATIVE FREE 10 ML: 5 INJECTION INTRAVENOUS at 09:34

## 2021-11-03 ASSESSMENT — PAIN SCALES - GENERAL: PAINLEVEL_OUTOF10: 0

## 2021-11-03 NOTE — PROGRESS NOTES
assessed for rehabilitation services?: Yes  Response to previous treatment: Patient with no complaints from previous session  Family / Caregiver Present: No    Subjective: RN approved session. patient supine in bed and agreeable to eval. A & O x 4. tangential with cues to stay on task. Pain:  Pain Assessment  Patient Currently in Pain: Denies    Vitals: Vitals not assessed per clinical judgement, see nursing flowsheet    Social/Functional History:  Lives With: Spouse  Type of Home: House  Home Layout: One level, Work area in basement  Home Access: Stairs to enter without rails  Entrance Stairs - Number of Steps: 2  Home Equipment:  (not sure)   Bathroom Shower/Tub: Walk-in shower, Tub/Shower unit  Bathroom Toilet: Standard  Bathroom Equipment: Toilet raiser, Shower chair  Bathroom Accessibility: Accessible       ADL Assistance: Independent  Homemaking Assistance: Independent  Ambulation Assistance: Independent  Transfer Assistance: Independent    Active : Yes  Occupation: Full time employment  Additional Comments: wife has health problems; however she is able to care for herself. VISION:WFL    HEARING:  reports being hard of hearing    COGNITION: Slow Processing and Tangential    RANGE OF MOTION:  Right Upper Extremity: WFL  Left Upper Extremity:  WFL    STRENGTH:  Right Upper Extremity: WFL  Left Upper Extremity:  WFL    SENSATION:   WFL    ADL:   Grooming: with set-up. to apply deodorant  Bathing: Stand By Assistance. to bathe UB, MIN A for back, SBA in standing with no AD to bathe donovan area. appeared to be unsteady when bending down to place wash cloth in wash basin to bathe donovan area   Upper Extremity Dressing: Minimal Assistance. to change hospital gown   Lower Extremity Dressing: Stand By Assistance. to doff/don slipper socks and to change underwear. doffed underwear in standing and attempted to step out of with cues for safety to prevent LOB. Toilet Transfer: Contact Guard Assistance.  with no AD and use of grab bars. BALANCE:  Sitting Balance:  Independent. no LOB during dyn sitting tasks   Standing Balance: Contact Guard Assistance. with no AD    BED MOBILITY:  Supine to Sit: Modified Independent with use of bedrails    TRANSFERS:  Sit to Stand:  Supervision. no unsteadiness in transition from sit to stand    FUNCTIONAL MOBILITY:  Assistive Device: None  Assist Level:  Contact Guard Assistance. Distance: To and from bathroom   appeared slightly unsteady with rigid posture. Exercise:  not completed    Activity Tolerance:  Patient tolerance of  treatment: fair. De-conditioned from inactivity, cues for pacing self      Assessment:  Assessment: patient is motivated to participate in OT and is ready to return home stating he feels like he can complete his ADLs/IADLs with no concerns. demonstates some unstadiness with bending over while standing and would benefit from contknued skilled OT to increase occupational performance, and activity tolerance to safely return home and resume ADLs/IADLs. Performance deficits / Impairments: Decreased ADL status, Decreased endurance  Prognosis: Good  REQUIRES OT FOLLOW UP: Yes  Decision Making: Low Complexity    Treatment Initiated: Treatment and education initiated within context of evaluation. Evaluation time included review of current medical information, gathering information related to past medical, social and functional history, completion of standardized testing, formal and informal observation of tasks, assessment of data and development of plan of care and goals. Treatment time included skilled education and facilitation of tasks to increase safety and independence with ADL's for improved functional independence and quality of life.     Discharge Recommendations:  Continue to assess pending progress, Patient would benefit from continued therapy after discharge    Patient Education:  OT Education: OT Role, Transfer Training, Plan of Care, Precautions, ADL Adaptive Strategies    Equipment Recommendations:  Equipment Needed: No    Plan:  Times per week: 3-5x  Times per day: Daily  Current Treatment Recommendations: Endurance Training, Neuromuscular Re-education, Patient/Caregiver Education & Training, Self-Care / ADL, Safety Education & Training. See long-term goal time frame for expected duration of plan of care. If no long-term goals established, a short length of stay is anticipated. Goals:  Patient goals : return home at [facility]  Short term goals  Time Frame for Short term goals: by discharge  Short term goal 1: Patient will tolerate 8 min functional standing with MOD I with two hand release to increase activity tolerance for self care routine. Short term goal 2: patient will complete ADL routine with MOD I with 0-1 verbal cues for safety. Short term goal 3: patient will be educated in energy conservation tech and recall 5 to incorporate into routine to increae ease and efficiency of completing self care tasks. Following session, patient left in safe position with all fall risk precautions in place.

## 2021-11-03 NOTE — PROGRESS NOTES
Hospitalist Progress Note    Patient:  Kezia Mei    YOB: 1960  Unit/Bed:6K-09/009-A  MRN: [id number]    Acct: [de-identified]   PCP: Tony Dickinson MD    Date of Admission: 10/31/2021      Assessment/Plan:  [facility] Acute PE, likely provoked by recent Covid infection: transition from heparin to lovenox subq. Non massive, no hypotension, trop elevation, or e/o rt heart strain. o Eliquis 11/2 after d/w patient   S/p LMWH  o Recommend at least 3-6 months treatment.   o O2 eval    o No calf pain or LE edema. o PT/OT   Covid 19 pneumonia: recent hospitalization with hypoxia and was treated with decadron and baricitinib, however dc'ed on room air thus these were stopped  o No ongoing hypoxia, therefore no ongoing need for immunomodulator/steroid therapy  o Symptomatic management, IS/Acapella.   o OK to be out of isolation today 11/2 per infection control   Suspected Bacterial Pneumonia: given recent hospitalization, will cover with Zosyn. MRSA nares negative. Received 1x dose Cefepime and Azithro in ED  o Continue Zosyn - can transition to PO option likely tomorrow, Azithro x5d  o    Mild Hyponatremia: resolved,     Chewing tobacco use: counseling.  Physical Deconditioning: due to recent covid, and current pneumonia and PE. PT and OT are consulted. Expected discharge date: Possible discharge today    Disposition: pending recovery - patient has declined [place] [id number] and SNF per my discussion. He has sisters that can help take care of him. [] Home  [] TCU  [] Rehab  [] Psych  [] SNF  [] [facility]  [] Other-    ===================================================================      Chief Complaint: SOB    Hospital Course: Patient with recent hospitalization and discharge for covid pneumonia. Presents with SOB and found to have acute right sided PE. Started on heparin drip and admitted to hospitalist service. There was no hypoxia or other hemodynamic instability. Normal tone. No abnormal movements. Skin: Warm and dry. No rashes or lesions. Neurologic:  No focal sensory/motor deficits in the upper or lower extremities. Cranial nerves:  grossly non-focal 2-12. Generalized weakness  Psychiatric: Alert and oriented, normal insight and thought content. Capillary Refill: Brisk,< 3 seconds. Peripheral Pulses: +2 palpable, equal bilaterally. Labs:   Recent Labs     11/01/21  0648 11/03/21  0501   WBC 10.6 7.2   HGB 12.2* 11.5*   HCT 38.3* 36.0*    267     Recent Labs     11/01/21  0648 11/02/21  0904    136   K 4.6 4.4    100   CO2 22* 26   BUN 18 16   CREATININE 0.9 1.0   CALCIUM 8.2* 8.4*     No results for input(s): AST, ALT, BILIDIR, BILITOT, ALKPHOS in the last 72 hours. No results for input(s): INR in the last 72 hours. No results for input(s): Khoi Delarosa in the last 72 hours. Recent Labs     11/02/21  0904   PROCAL 0.25*    No results found for: Luvenia Anupama, BACTERIA, RBCUA, BLOODU, SPECGRAV, GLUCOSEU    Radiology (48 hours):  CTA CHEST W WO CONTRAST    Result Date: 10/31/2021  Impression: 1. Large right lower lobe pulmonary arterial embolism. 2. Acute pneumonia, primary impact noted the right lower lobe. . This document has been electronically signed by: Robbie Mares MD on 10/31/2021 05:17 AM All CTs at this facility use dose modulation techniques and iterative reconstructions, and/or weight-based dosing when appropriate to reduce radiation to a low as reasonably achievable. XR CHEST PORTABLE    Result Date: 10/31/2021  Impression: Worsening right basilar pneumonia. This document has been electronically signed by: Robbie Mares MD on 10/31/2021 04:11 AM       DVT prophylaxis:    [] Lovenox  [] SCDs  [] SQ Heparin  [] Encourage ambulation   [x] Already on Anticoagulation       Diet: ADULT DIET;  Regular  Code Status: Full Code  PT/OT: yes  Tele:   IVF: will stop    Electronically signed by Tiffany Lobato, DO on 11/3/2021 at 10:08 AM

## 2021-11-03 NOTE — PROGRESS NOTES
Pt removed from [facility] isolation per [facility] guidelines as approved by Dr. Tima Sykes.

## 2021-11-03 NOTE — PROGRESS NOTES
CLINICAL PHARMACY: DISCHARGE MED RECONCILIATION/REVIEW    [facility] ([facility]) Select Patient?: No  Total # of Interventions Recommended: 1 -    - New Order #: 1  Total # Interventions Accepted: 1  Intervention Severity:   - Level 1 Intervention Present?: No   - Level 2 #: 1   - Level 3 #: 0   Time Spent (min): 15    Erik Dailey PharmD, BCPS  11/3/2021  10:43 AM

## 2021-11-03 NOTE — PROGRESS NOTES
[address] [address]  INPATIENT PHYSICAL THERAPY  EVALUATION  [facility] RENAL TELEMETRY 6K - [id number]    Time In: 7756  Time Out: 1043  Timed Code Treatment Minutes: 15 Minutes  Minutes: 21          Date: 11/3/2021  Patient Name: Sinan Morrissey,  Gender:  male        MRN: [id number]  : 1960  (59 y.o.)      Referring Practitioner: Socorro Kumar DO  Diagnosis: Pulmonary embolism on right  Additional Pertinent Hx: per chart review; Patient is a 77-year-old male with past medical history of nerve damage and hernia repair who presents to [address] ED with chief complaint of right-sided chest pain. History is obtained via patient and chart review. Of note, patient was recently discharged from the Covid unit on 10/25/2021. Patient reports that after discharge on 10/25 he returned home and was doing well for a couple of days, however starting on Friday he began to experience right-sided chest pain. Patient denies radiation of this pain into his jaw, arms, or into his back however he notes that it goes sometimes down below the knee in his right rib cage. Patient reports his pain is 7/10 in severity and that it is worse when he coughs or if he moves. Patient relates that upon arriving home from the hospital he was fairly active at first, however when he started to have this pain he reports that he was not moving very much at home. Patient denies any abdominal pain, nausea, vomiting, diarrhea. He admits to some intermittent dyspnea, specially with exertion. Admits to a dry cough that has greatly improved since his last admission. No further complaints noted at this time. In ED CTA showed a large right lower lobe pulmonary embolism in addition to acute pneumonia. Patient was started on heparin drip and hospitalist was contacted for admission.      Restrictions/Precautions:  Restrictions/Precautions: Fall Risk, General Precautions    Subjective:  Chart Reviewed: Yes  Patient assessed for rehabilitation services?: Yes  Subjective: Pt sitting up in recliner and agrees to therapy. Pt is hoping to be discharged later today. General:  Overall Orientation Status: Within Normal Limits    Vision: Within Functional Limits    Hearing: Within functional limits         Pain: denies      Vitals: Oxygen: Room air. 93% at rest and ranged 86 to 98% during ambulation with no supplement    Social/Functional History:    Lives With: Spouse  Type of Home: House  Home Layout: One level, Work area in basement  Home Access: Stairs to enter without rails  Entrance Stairs - Number of Steps: 2  Home Equipment:  (not sure)     Bathroom Shower/Tub: Walk-in shower, Tub/Shower unit  Bathroom Toilet: Standard  Bathroom Equipment: Toilet raiser, Shower chair  Bathroom Accessibility: Accessible       ADL Assistance: Independent  Homemaking Assistance: Independent  Ambulation Assistance: Independent  Transfer Assistance: Independent    Active : Yes  Occupation: Full time employment  Additional Comments: wife has health problems; however she is able to care for herself. OBJECTIVE:  Range of Motion:  Bilateral Lower Extremity: WFL    Strength:  Bilateral Lower Extremity: WFL    Balance:  Static Sitting Balance:  Modified Independent  Dynamic Sitting Balance: Modified Independent  Static Standing Balance: Supervision, Stand By Assistance  Dynamic Standing Balance: Supervision, Stand By Assistance    Bed Mobility:  Not Tested    Transfers:  Sit to Stand: Supervision  Stand to Sit:Supervision    Ambulation:  Supervision, Stand By Assistance  Distance: 700 ft  Surface: Level Tile  Device:No Device  Gait Deviations:  Decreased Step Length Bilaterally and generally steady with slower pace than his normal per report. Exercise:  Patient was guided in 1 set(s) 10-12 reps of exercise to both lower extremities. Glut sets, Quad sets, Hip abduction/adduction, Seated marches, Seated heel/toe raises and Long arc quads.   Exercises were completed for increased independence with functional mobility. Functional Outcome Measures: Not completed       ASSESSMENT:  Activity Tolerance:  Patient tolerance of  treatment: good. Treatment Initiated: Treatment and education initiated within context of evaluation. Evaluation time included review of current medical information, gathering information related to past medical, social and functional history, completion of standardized testing, formal and informal observation of tasks, assessment of data and development of plan of care and goals. Treatment time included skilled education and facilitation of tasks to increase safety and independence with functional mobility for improved independence and quality of life. Assessment: Body structures, Functions, Activity limitations: Decreased endurance  Assessment: Pt is a 60 yo male that is mildly deconditioned and tolerated mobility assessment with 1 standing rest break to recover breath and O2 sats that dropped to 86% with quick recovery to >92% and stayed up the remainder of distance. Pt is moving generally with SBA to Supervision. Prognosis: Good, Excellent         Discharge Recommendations:  Discharge Recommendations: Home with assist PRN    Patient Education:  PT Education: Goals, PT Role, Home Exercise Program    Equipment Recommendations:  Equipment Needed: No    Plan:  Current Treatment Recommendations: Gait Training, Patient/Caregiver Education & Training, Home Exercise Program, Endurance Training    Goals:  Patient goals : go home  Short term goals  Time Frame for Short term goals: not set. Pt was discharged before completion of documentation. Following session, patient left in safe position with all fall risk precautions in place. Deb Saxena.  Ana M Powers [address]

## 2021-11-04 ENCOUNTER — CARE COORDINATION (OUTPATIENT)
Dept: CASE MANAGEMENT | Age: 61
End: 2021-11-04

## 2021-11-04 NOTE — CARE COORDINATION
[facility] Transitions Initial Follow Up Call    Call within 2 business days of discharge: Yes    Patient: Kisha Joyce Patient : 1960   MRN: [id number]  Reason for Admission: Pulmonary embolism  Discharge Date: 11/3/21 RARS: Readmission Risk Score: 15      Last Discharge [facility]       Complaint Diagnosis Description Type Department Provider    10/31/21 Positive For Covid-19; Shortness of Breath Right pulmonary embolus ([address] . .. ED to Hosp-Admission (Discharged) (ADMITTED) PARAMJIT 6K Tiffany Lobato DO; Anuj Yepez MD;... Spoke with:  Farzad Yang, patient    Facility:  [facility]    Contacted patient for initial transition follow up. Mr. Kameron Barone states that he is doing well so far. States the shortness of breath has improved. Reports his O2 sats 95-97% on RA. No c/o chest pain/discomfort, pressure, tightness. Reports he is eating and drinking fluids w/o issues. Did not review medication list.  He informed CTN someone sets up his medications for him every day. He states that he received all of his new medications prescribed. Has appointment scheduled for 21. Discussed when to contact provider with any new or worsening symptoms. He verbalized understanding. No questions or concerns for CTN at this time. Transitions of Care Initial Call    Was this an external facility discharge? No Discharge Facility: [facility] to be reviewed by the provider   Additional needs identified to be addressed with provider: No  none             Method of communication with provider : none    Was this a readmission? Yes  Patient stated reason for admission: Shortness of breath and chest pain  Patients top risk factors for readmission: medical condition-Acute respiratory failure d/t COVID, pulmonary embolism    Care Transition Nurse (CTN) contacted the patient by telephone to perform post hospital discharge assessment. Verified name and  with patient as identifiers.  Provided introduction to self, and explanation of the CTN role. CTN reviewed discharge instructions, medical action plan and red flags with patient who verbalized understanding. Patient given an opportunity to ask questions and does not have any further questions or concerns at this time. Were discharge instructions available to patient? Yes. Reviewed appropriate site of care based on symptoms and resources available to patient including: PCP. The patient agrees to contact the PCP office for questions related to their healthcare. Medication reconciliation was performed with patient, who verbalizes understanding of administration of home medications. Advised obtaining a 90-day supply of all daily and as-needed medications. Covid Risk Education     Educated patient about risk for severe COVID-19 due to risk factors according to CDC guidelines. CTN reviewed discharge instructions, medical action plan and red flag symptoms with the patient who verbalized understanding. Discussed COVID vaccination status: No. Education provided on COVID-19 vaccination as appropriate. Discussed exposure protocols and quarantine with CDC Guidelines. Patient was given an opportunity to verbalize any questions and concerns and agrees to contact CTN or health care provider for questions related to their healthcare. Reviewed and educated patient on any new and changed medications related to discharge diagnosis. Was patient discharged with a pulse oximeter? No Discussed and confirmed pulse oximeter discharge instructions and when to notify provider or seek emergency care. CTN provided contact information. Plan for follow-up call in 5-7 days based on severity of symptoms and risk factors. Plan for next call: symptom management-any new or worsening symptoms      Follow Up  No future appointments.     Floyd Ott RN

## 2021-11-05 LAB
BLOOD CULTURE, ROUTINE: NORMAL
BLOOD CULTURE, ROUTINE: NORMAL

## 2021-11-09 NOTE — DISCHARGE SUMMARY
Hospital Medicine Discharge Summary      Patient Identification:   Azalia Halsted   : 1960  MRN: [id number]   Account: [de-identified]      Patient's PCP: Bala Murphy MD    Admit Date: 10/31/2021     Discharge Date: 11/3/2021      Admitting Physician: Chad Jones MD     Discharge Physician: Ida Abdi DO     Discharge Diagnoses: Active Hospital Problems    Diagnosis Date Noted    Pulmonary embolism on right [facility]) [I26.99] 10/31/2021       The patient was seen and examined on day of discharge and this discharge summary is in conjunction with any daily progress note from day of discharge. Hospital Course:   Azalia Halsted is a 59 y.o. male admitted to [facility] on 10/31/2021 for SOB. · Acute PE, likely provoked by recent Covid infection: transition from heparin to lovenox subq. Non massive, no hypotension, trop elevation, or e/o rt heart strain. ? Eliquis  after d/w patient   S/p LMWH  ? Recommend at least 3-6 months treatment. ? O2 eval    ? No calf pain or LE edema. ? PT/OT  · Covid 19 pneumonia: recent hospitalization with hypoxia and was treated with decadron and baricitinib, however dc'ed on room air thus these were stopped  ? No ongoing hypoxia, therefore no ongoing need for immunomodulator/steroid therapy  ? Symptomatic management, IS/Acapella. ? OK to be out of isolation today  per infection control  · Suspected Bacterial Pneumonia: given recent hospitalization, will cover with Zosyn. MRSA nares negative. Received 1x dose Cefepime and Azithro in ED  ? Continue Zosyn - can transition to PO option likely tomorrow, Azithro x5d  ?    · Mild Hyponatremia: resolved,    · Chewing tobacco use: counseling. · Physical Deconditioning: due to recent covid, and current pneumonia and PE. PT and OT are consulted.          Exam:     Vitals:  Vitals:    21 1930 21 2311 21 0305 21 0915   BP: 108/70 111/81 100/64 109/73   Pulse: 87 85 Result   Impression:   1. Large right lower lobe pulmonary arterial embolism. 2. Acute pneumonia, primary impact noted the right lower lobe. .      This document has been electronically signed by: Moi Chavez MD on    10/31/2021 05:17 AM      All CTs at this facility use dose modulation techniques and iterative    reconstructions, and/or weight-based dosing   when appropriate to reduce radiation to a low as reasonably achievable. XR CHEST PORTABLE   Final Result   Impression:   Worsening right basilar pneumonia. This document has been electronically signed by: Moi Chavez MD on    10/31/2021 04:11 AM             Consults:     None    Disposition:    [x] Home       [] TCU       [] Rehab       [] Psych       [] SNF       [] [facility]       [] Other-    Condition at Discharge: Stable    Code Status:  Prior     Patient Instructions:    Discharge lab work: Activity: activity as tolerated  Diet: No diet orders on file      Follow-up visits:   Eliane Tinoco MD  [address] [place] [address]  [phone]    Schedule an appointment as soon as possible for a visit on 11/17/2021  Your appointment time is at 3:45     , Arrive 15 minutes early, please bring photo ID and medications         Discharge Medications:     @DISCHMEDS(<NOROUTINE> error)@    Time Spent on discharge is more than 45 minutes in the examination, evaluation, counseling and review of medications and discharge plan. Signed: Thank you Eliane Tinoco MD for the opportunity to be involved in this patient's care.     Electronically signed by Billy Castro DO on 11/9/2021 at 3:30 PM

## 2021-11-10 ENCOUNTER — CARE COORDINATION (OUTPATIENT)
Dept: CASE MANAGEMENT | Age: 61
End: 2021-11-10

## 2021-11-10 NOTE — CARE COORDINATION
Covid-19 Subsequent Follow Up Call    Patient contacted regarding COVID-19 risk, exposure and diagnosis. Discussed COVID-19 related testing which was available at this time. Test results were positive. Patient informed of results, if available? Yes    Care Transition Nurse contacted the patient by telephone to perform follow-up assessment. Verified name and  with patient as identifiers. Patient has following risk factors of: no known risk factors and Covid. Spoke with Mikey Wright, said he is slowly improving. He O2 sats stay above 90%, 96-98% on RA. He has been able to get outside and take walks. Is not using the IS as much but will be when he can't get out to walk with the weather. His SOB continues to improve. Denies any chest pain. Is eating and drinking fluids. Taking medications as directed. Has PCP appt on . No other questions or concerns at this time. Will continue to follow. Symptoms reviewed with patient who verbalized the following symptoms: no new symptoms and no worsening symptoms. Due to no new or worsening symptoms encounter was not routed to provider for escalation. Educated patient about risk for severe COVID-19 due to risk factors according to CDC guidelines. CTN reviewed discharge instructions, medical action plan and red flag symptoms with the patient who verbalized understanding. Discussed COVID vaccination status: Yes. Education provided on COVID-19 vaccination as appropriate. Discussed exposure protocols and quarantine with CDC Guidelines. Patient was given an opportunity to verbalize any questions and concerns and agrees to contact CTN or health care provider for questions related to their healthcare. Was patient discharged with a pulse oximeter? No, has at home. Discussed and confirmed pulse oximeter discharge instructions and when to notify provider or seek emergency care. CTN provided contact information.  Plan for follow-up call in 5-7 days based on severity of symptoms and risk factors.

## 2021-11-18 ENCOUNTER — CARE COORDINATION (OUTPATIENT)
Dept: CASE MANAGEMENT | Age: 61
End: 2021-11-18

## 2021-11-18 NOTE — CARE COORDINATION
Covid-19 Subsequent Follow Up Call-Final    You Patient resolved from the Care Transitions episode on 11/18/21  Discussed COVID-19 related testing which was available at this time. Test results were positive. Patient informed of results, if available? Yes    Patient/family has been provided the following resources and education related to COVID-19:                         Signs, symptoms and red flags related to COVID-19            CDC exposure and quarantine guidelines            [employer] exposure contact - [phone]            Contact for their local Department of Health                 Patient currently reports that the following symptoms have improved:  fever, fatigue, pain or aching joints, cough, shortness of breath, confusion or unusual change in mental status, chills or shaking, sweating, fast heart rate, fast breathing, dizziness/lightheadedness, less urine output, cold, clammy, and pale skin, low body temperature and no new/worsening symptoms     Spoke with Julio Cesar Sampson, said he is feeling alright. Still gets a little winded when doing things but will sit and rest, quickly recovers. Walking around the house since the weather is not cooperating. Wants to return to work but does a lot of walking and will discuss with PCP tomorrow when he should return. No other questions or concerns. No further outreach scheduled with this CTN/ACM. Episode of Care resolved. Patient has this CTN/ACM contact information if future needs arise.

## 2022-05-27 LAB
ALBUMIN SERPL-MCNC: 3.9 G/DL
ALP BLD-CCNC: 104 U/L
ALT SERPL-CCNC: 15 U/L
ANION GAP SERPL CALCULATED.3IONS-SCNC: 7 MMOL/L
AST SERPL-CCNC: 25 U/L
BASOPHILS ABSOLUTE: NORMAL
BASOPHILS RELATIVE PERCENT: NORMAL
BILIRUB SERPL-MCNC: 0.5 MG/DL (ref 0.1–1.4)
BUN BLDV-MCNC: 14 MG/DL
CALCIUM SERPL-MCNC: 9.1 MG/DL
CHLORIDE BLD-SCNC: 103 MMOL/L
CHOLESTEROL, TOTAL: 158 MG/DL
CHOLESTEROL/HDL RATIO: NORMAL
CO2: 30 MMOL/L
CREAT SERPL-MCNC: 1.32 MG/DL
EGFR: 41
EOSINOPHILS ABSOLUTE: NORMAL
EOSINOPHILS RELATIVE PERCENT: NORMAL
GLUCOSE BLD-MCNC: 89 MG/DL
HCT VFR BLD CALC: 42.7 % (ref 41–53)
HDLC SERPL-MCNC: 42 MG/DL (ref 35–70)
HEMOGLOBIN: 14.4 G/DL (ref 13.5–17.5)
LDL CHOLESTEROL CALCULATED: 96 MG/DL (ref 0–160)
LYMPHOCYTES ABSOLUTE: NORMAL
LYMPHOCYTES RELATIVE PERCENT: NORMAL
MCH RBC QN AUTO: NORMAL PG
MCHC RBC AUTO-ENTMCNC: NORMAL G/DL
MCV RBC AUTO: NORMAL FL
MONOCYTES ABSOLUTE: NORMAL
MONOCYTES RELATIVE PERCENT: NORMAL
NEUTROPHILS ABSOLUTE: NORMAL
NEUTROPHILS RELATIVE PERCENT: NORMAL
NONHDLC SERPL-MCNC: NORMAL MG/DL
PLATELET # BLD: 281 K/ΜL
PMV BLD AUTO: NORMAL FL
POTASSIUM SERPL-SCNC: 4.5 MMOL/L
RBC # BLD: 4.55 10^6/ΜL
SODIUM BLD-SCNC: 9.1 MMOL/L
TOTAL PROTEIN: 6.6
TRIGL SERPL-MCNC: 98 MG/DL
VLDLC SERPL CALC-MCNC: 20 MG/DL
WBC # BLD: 5.6 10^3/ML

## 2022-10-04 NOTE — CARE COORDINATION
11/01/21 1117   Readmission Assessment   Number of Days since last admission? 1-7 days   Previous Disposition Home Alone   Who is being Interviewed Patient   What was the patient's/caregiver's perception as to why they think they needed to return back to the hospital? Other (Comment)  (started to feel worse and having pain/SOB)   Did you visit your Primary Care Physician after you left the hospital, before you returned this time? No   Why weren't you able to visit your PCP? Other (Comment)  (appointment was today)   Did you see a specialist, such as Cardiac, Pulmonary, Orthopedic Physician, etc. after you left the hospital? No   Who advised the patient to return to the hospital? Self-referral   Does the patient report anything that got in the way of taking their medications? No   In our efforts to provide the best possible care to you and others like you, can you think of anything that we could have done to help you after you left the hospital the first time, so that you might not have needed to return so soon?  Other (Comment)  (\"no\")
11/1/21, 11:09 AM EDT  DISCHARGE PLANNING EVALUATION:    Zoe Hernandez       Admitted: 10/31/2021/ [address] day: 1   Location: [address] [place] [address] Reason for admit: Pulmonary embolism on right [facility]) [I26.99]  Right pulmonary embolus ([address] [I26.99]  Pneumonia of right lower lobe due to infectious organism [J18.9]   PMH:  has a past medical history of Nerve damage. Procedure: none  Barriers to Discharge:  [address], Ca+ 8.2. IVF, heparin gtt, IV zosyn, po zithromax, Vit C, Vit D, zinc.  IS, acapella. Echo ordered. PCP: Vy Hayes MD  Readmission Risk Score: 12.3%    Patient Goals/Plan/Treatment Preferences: Spoke with Tracy Eden, he is from home with his wife who is currently a patient on 3A. He is independent, employed, and does not require DME. He denies need for HH/OP services at this time. Will follow. Transportation/Food Security/Housekeeping Addressed:  No issues identified.
Discharge Planning Update:    Anticipate discharge today. Home oxygen eval ordered.   Electronically signed by Falguni Childs RN on 11/3/2021 at 2:41 PM
Statement Selected

## 2023-07-27 LAB
ALBUMIN SERPL-MCNC: 4.4 G/DL
ALP BLD-CCNC: 104 U/L
ALT SERPL-CCNC: 18 U/L
ANION GAP SERPL CALCULATED.3IONS-SCNC: 6 MMOL/L
AST SERPL-CCNC: 26 U/L
BASOPHILS ABSOLUTE: 0.04 /ΜL
BASOPHILS RELATIVE PERCENT: 0.6 %
BILIRUB SERPL-MCNC: 0.5 MG/DL (ref 0.1–1.4)
BUN BLDV-MCNC: 15 MG/DL
CALCIUM SERPL-MCNC: 9.4 MG/DL
CHLORIDE BLD-SCNC: 104 MMOL/L
CO2: 31 MMOL/L
CREAT SERPL-MCNC: 1.48 MG/DL
EGFR: 53
EOSINOPHILS ABSOLUTE: 0.02 /ΜL
EOSINOPHILS RELATIVE PERCENT: 0.3 %
GLUCOSE BLD-MCNC: 96 MG/DL
HCT VFR BLD CALC: 44.9 % (ref 41–53)
HEMOGLOBIN: 15.3 G/DL (ref 13.5–17.5)
LYMPHOCYTES ABSOLUTE: 0.87 /ΜL
LYMPHOCYTES RELATIVE PERCENT: 12.2 %
MCH RBC QN AUTO: 31.7 PG
MCHC RBC AUTO-ENTMCNC: 34.1 G/DL
MCV RBC AUTO: 93.2 FL
MONOCYTES ABSOLUTE: 0.51 /ΜL
MONOCYTES RELATIVE PERCENT: 7.1 %
NEUTROPHILS ABSOLUTE: 5.64 /ΜL
NEUTROPHILS RELATIVE PERCENT: 79 %
PLATELET # BLD: 234 K/ΜL
PMV BLD AUTO: 9.4 FL
POTASSIUM SERPL-SCNC: 3.9 MMOL/L
RBC # BLD: 4.82 10^6/ΜL
SODIUM BLD-SCNC: 141 MMOL/L
TOTAL PROTEIN: 6.4
WBC # BLD: 7.1 10^3/ML

## 2023-07-28 LAB
CHOLESTEROL, TOTAL: 187 MG/DL
CHOLESTEROL/HDL RATIO: NORMAL
HDLC SERPL-MCNC: 56 MG/DL (ref 35–70)
LDL CHOLESTEROL CALCULATED: 120 MG/DL (ref 0–160)
NONHDLC SERPL-MCNC: NORMAL MG/DL
TRIGL SERPL-MCNC: 57 MG/DL
VITAMIN B-12: 1820
VLDLC SERPL CALC-MCNC: 11 MG/DL

## 2023-08-08 PROBLEM — I26.99 PULMONARY EMBOLISM (HCC): Status: ACTIVE | Noted: 2023-08-08

## 2023-08-08 PROBLEM — Z13.31 SCREENING FOR DEPRESSION: Status: ACTIVE | Noted: 2023-08-08

## 2023-08-08 PROBLEM — R30.0 DYSURIA: Status: ACTIVE | Noted: 2023-08-08

## 2023-08-08 PROBLEM — G60.9 IDIOPATHIC NEUROPATHY: Status: ACTIVE | Noted: 2023-08-08

## 2023-08-08 RX ORDER — LANOLIN ALCOHOL/MO/W.PET/CERES
1000 CREAM (GRAM) TOPICAL DAILY
COMMUNITY

## 2023-08-15 ENCOUNTER — OFFICE VISIT (OUTPATIENT)
Dept: NEPHROLOGY | Age: 63
End: 2023-08-15
Payer: COMMERCIAL

## 2023-08-15 VITALS
DIASTOLIC BLOOD PRESSURE: 89 MMHG | WEIGHT: 191 LBS | OXYGEN SATURATION: 99 % | HEART RATE: 70 BPM | HEIGHT: 73 IN | BODY MASS INDEX: 25.31 KG/M2 | SYSTOLIC BLOOD PRESSURE: 119 MMHG

## 2023-08-15 DIAGNOSIS — N40.1 BENIGN PROSTATIC HYPERPLASIA WITH LOWER URINARY TRACT SYMPTOMS, SYMPTOM DETAILS UNSPECIFIED: ICD-10-CM

## 2023-08-15 DIAGNOSIS — N18.32 STAGE 3B CHRONIC KIDNEY DISEASE (HCC): Primary | ICD-10-CM

## 2023-08-15 PROCEDURE — 4004F PT TOBACCO SCREEN RCVD TLK: CPT | Performed by: INTERNAL MEDICINE

## 2023-08-15 PROCEDURE — G8419 CALC BMI OUT NRM PARAM NOF/U: HCPCS | Performed by: INTERNAL MEDICINE

## 2023-08-15 PROCEDURE — G8427 DOCREV CUR MEDS BY ELIG CLIN: HCPCS | Performed by: INTERNAL MEDICINE

## 2023-08-15 PROCEDURE — 3017F COLORECTAL CA SCREEN DOC REV: CPT | Performed by: INTERNAL MEDICINE

## 2023-08-15 PROCEDURE — 99204 OFFICE O/P NEW MOD 45 MIN: CPT | Performed by: INTERNAL MEDICINE

## 2023-08-15 PROCEDURE — 1124F ACP DISCUSS-NO DSCNMKR DOCD: CPT | Performed by: INTERNAL MEDICINE

## 2023-08-15 NOTE — PATIENT INSTRUCTIONS
Drugs to Avoid with Chronic Kidney Disease    Non-steroidal anti-inflammatory drugs (NSAIDS)    Potential complication and side effects of NSAIDS include:  Kidney injury and worsening kidney function   Risk of stomach ulcer and intestinal bleeding  Fluid retention and edema  Increased Blood Pressure    Non-Steroidal Anti-Inflammatory Drugs    Celecoxib (Celebrex)  Choline and magnesium salicylates (CMT, Trisosal, Trilisate)  Choline salicylate (Arthropan)  Diclofenac (Voltaren, Arthrotec, Cataflam, Cambia, Voltaren-XR, Zipsor)  Diflunisal (Dolobid)  Etodolac (Lodine XL, Lodine)  Famotidine + Ibuprofen (Duexis)  Fenoprofen (Nalfon, Nalfon 200)  Furbiprofen (Asaid)  Ibuprofen (Advil, Motrin, Midol, Nuprin, Genpril, Dolgesic,Profen,, Vicoprofen,Combunox, Actiprofen, Addaprin, Caldolor, Haltran, Q-Profen,Ibren, Menadol, Rufen,Saleto-200, Maquon,Ultraprin, Uni-Pro,Wal-Profen)  Indomethacin (Indocin, Indomethegan, Indo-Franky)   Ketoprofen (Orudis  KT, Oruvail, Actron)  Ketorolac (Toradol, Sprix)  Magnesium Sulfate (Arthritab, Jeanna Select, Bertram's pills, Negrito, Mobidin, Mobogesic)  Meclofenamate Sodium (Ponstel)  Meloxicam (Mobic)  Naproxen (Aleve, Anaprox, Naprosyn, EC-Naprosyn, Naprelan, All Day Pain Relief, Aflaxen, Anaprox-DS, Midol Extended Relief, Naprelan,Prevacid NapraPac, Naprapac, Vimovo)  Nabumetone (Relafen)  Oxaprozin (Daypro)  Piroxicam (Feldene)  Rofecoxib (Vioxx)  Salsalate (Amigesic, Anaflex 750, Disalcid, Marthritic, Mono-gesic, Salflex, Salsitab)  Sodium salicylate (various generics)  Sulindac (Clinoril)  Tolmetin (Tolectin)  Valdecoxib (Bextra)  Mefenamic Acid (Ponstel)  Famotidine and Ibuprofen (Duexis) but not famotidine by itself  Meclofenamate (Meclomen)    Antibiotics  Bactrim  Gentamycin  Tobramycin  Vancomycin  Tetracycline  Macrobid    Other                  IV contrast dye

## 2023-08-21 LAB
BUN BLDV-MCNC: 13 MG/DL
CALCIUM SERPL-MCNC: 9.8 MG/DL
CHLORIDE BLD-SCNC: 103 MMOL/L
CO2: 28 MMOL/L
CREAT SERPL-MCNC: 1.41 MG/DL
EGFR: 55
GLUCOSE BLD-MCNC: 92 MG/DL
POTASSIUM SERPL-SCNC: 4.3 MMOL/L
PTH INTACT: 34
SODIUM BLD-SCNC: 141 MMOL/L
VITAMIN D 25-HYDROXY: 35
VITAMIN D2, 25 HYDROXY: NORMAL
VITAMIN D3,25 HYDROXY: NORMAL

## 2023-08-22 ENCOUNTER — HOSPITAL ENCOUNTER (OUTPATIENT)
Dept: ULTRASOUND IMAGING | Age: 63
Discharge: HOME | End: 2023-08-22
Attending: INTERNAL MEDICINE
Payer: COMMERCIAL

## 2023-08-22 DIAGNOSIS — N18.32 STAGE 3B CHRONIC KIDNEY DISEASE (HCC): ICD-10-CM

## 2023-08-22 PROCEDURE — 76770 US EXAM ABDO BACK WALL COMP: CPT

## 2023-08-23 ENCOUNTER — TELEPHONE (OUTPATIENT)
Dept: NEPHROLOGY | Age: 63
End: 2023-08-23

## 2023-08-23 NOTE — TELEPHONE ENCOUNTER
I spoke to Ruperto yang about this. He said the lady that did his ultrasound said his bladder looked good.  He wants to wait till he sees you to decide if he wants to see a urologist.

## 2023-08-23 NOTE — TELEPHONE ENCOUNTER
----- Message from Toñito Pino MD sent at 8/23/2023  7:25 AM EDT -----  He is retaining urine in the bladder according to the ultrasound.   I would like for him to see a urologist.

## 2023-09-07 PROBLEM — Z13.31 SCREENING FOR DEPRESSION: Status: RESOLVED | Noted: 2023-08-08 | Resolved: 2023-09-07

## 2023-11-21 ENCOUNTER — OFFICE VISIT (OUTPATIENT)
Dept: UROLOGY | Age: 63
End: 2023-11-21
Payer: MEDICARE

## 2023-11-21 VITALS — WEIGHT: 199 LBS | HEIGHT: 73 IN | BODY MASS INDEX: 26.37 KG/M2 | RESPIRATION RATE: 16 BRPM

## 2023-11-21 DIAGNOSIS — N13.30 HYDRONEPHROSIS, UNSPECIFIED HYDRONEPHROSIS TYPE: ICD-10-CM

## 2023-11-21 DIAGNOSIS — N40.1 BENIGN LOCALIZED PROSTATIC HYPERPLASIA WITH LOWER URINARY TRACT SYMPTOMS (LUTS): Primary | ICD-10-CM

## 2023-11-21 PROCEDURE — G8427 DOCREV CUR MEDS BY ELIG CLIN: HCPCS | Performed by: UROLOGY

## 2023-11-21 PROCEDURE — 99204 OFFICE O/P NEW MOD 45 MIN: CPT | Performed by: UROLOGY

## 2023-11-21 PROCEDURE — G8484 FLU IMMUNIZE NO ADMIN: HCPCS | Performed by: UROLOGY

## 2023-11-21 PROCEDURE — 3017F COLORECTAL CA SCREEN DOC REV: CPT | Performed by: UROLOGY

## 2023-11-21 PROCEDURE — G8419 CALC BMI OUT NRM PARAM NOF/U: HCPCS | Performed by: UROLOGY

## 2023-11-21 PROCEDURE — 1124F ACP DISCUSS-NO DSCNMKR DOCD: CPT | Performed by: UROLOGY

## 2023-11-21 PROCEDURE — 4004F PT TOBACCO SCREEN RCVD TLK: CPT | Performed by: UROLOGY

## 2023-11-21 NOTE — PROGRESS NOTES
Marion Black MD.    [address]  [address] [address]  Dept: [phone]  Dept Fax: [phone]  Loc: [facility] Urology Office Note -     Patient:  Seun Driver  YOB: 1960    The patient is a 70 y.o. male who presents today for evaluation of the following problems:   Chief Complaint   Patient presents with    New Patient     Having issues urinating, has been going on for 3yrs. Sometimes is normal other times is weak urinary stream especially overnight. Feels like isn't emptying bladder. Other     Obstructive nephropathy     referred/consultation requested by Aparna Rehman, APRN - CNP. History of Present Illness:    BPH  Pvr > 300 cc  Unhappy auass 14    Right hydronephrosis  Mild  Tobacco+  No gross hematuria    Psa < 2    Requested/reviewed records from [place], [employer]elyn [employer], APRN - [facility] office and/or outside physician/EMR    (Patient's old records have been requested, reviewed and pertinent findings summarized in today's note.)    Procedures Today: N/A      Last several PSA's:  No results found for: \"PSA\"    Last total testosterone:  No results found for: \"TESTOSTERONE\"    Urinalysis today:  No results found for this visit on 11/21/23. Last BUN and creatinine:  Lab Results   Component Value Date    BUN 13 08/21/2023     Lab Results   Component Value Date    CREATININE 1.41 08/21/2023         Imaging Reviewed during this Office Visit:   Leobardo Santos MD independently reviewed the images and verified the radiology reports from:    US RENAL COMPLETE    Result Date: 8/22/2023  PROCEDURE: US RENAL COMPLETE CLINICAL INFORMATION: Chronic kidney disease, stage III B TECHNIQUE: Ultrasound of the kidneys and urinary bladder was performed. Grayscale and color images were obtained. COMPARISON: None FINDINGS: The right kidney measures 11.4 x 5.1 x 6.1 cm and the left kidney measures 10.4 x 4.9 x 6.5 cm.

## 2023-11-22 ENCOUNTER — TELEPHONE (OUTPATIENT)
Dept: UROLOGY | Age: 63
End: 2023-11-22

## 2023-11-22 RX ORDER — TAMSULOSIN HYDROCHLORIDE 0.4 MG/1
0.4 CAPSULE ORAL DAILY
Qty: 30 CAPSULE | Refills: 2 | Status: SHIPPED | OUTPATIENT
Start: 2023-11-22

## 2023-11-22 NOTE — TELEPHONE ENCOUNTER
Patient seen Dr Angela Burdick yesterday. Office note states BPH- worsening urinary symptoms with retention. Start flomax. Reassess in three months    Can you send the flomax to [facility]? Pharmacy did not receive it. Bernie Coffey would like notified when it is sent.

## 2024-02-11 DIAGNOSIS — N40.1 BENIGN LOCALIZED PROSTATIC HYPERPLASIA WITH LOWER URINARY TRACT SYMPTOMS (LUTS): Primary | ICD-10-CM

## 2024-02-12 RX ORDER — TAMSULOSIN HYDROCHLORIDE 0.4 MG/1
CAPSULE ORAL DAILY
Qty: 30 CAPSULE | Refills: 5 | Status: SHIPPED | OUTPATIENT
Start: 2024-02-12 | End: 2024-02-22 | Stop reason: SDUPTHER

## 2024-02-12 NOTE — TELEPHONE ENCOUNTER
Suleman Lovelace called requesting a refill on the following medications:  Requested Prescriptions     Pending Prescriptions Disp Refills    tamsulosin (FLOMAX) 0.4 MG capsule [Pharmacy Med Name: TAMSULOSIN HCL 0.4 MG CAPSULE] 30 capsule 2     Sig: TAKE 1 CAPSULE BY MOUTH EVERY DAY     Pharmacy verified:  [email or web address]      Date of last visit: 11/21/2023  Date of next visit (if applicable): 2/22/2024

## 2024-02-13 LAB
APPEARANCE: CLEAR
BACTERIA: ABNORMAL PER HPF
BILIRUBIN: NEGATIVE
COLOR: YELLOW
EPITHELIAL CELLS: ABNORMAL PER HPF (ref 0–5)
GLUCOSE BLD-MCNC: NEGATIVE MG/DL
HYALINE CASTS: ABNORMAL
KETONES, URINE: NEGATIVE
LEUKOCYTE ESTERASE, URINE: NEGATIVE
NITRITE, URINE: NEGATIVE
OCCULT BLOOD,URINE: ABNORMAL
PH: 5.5 (ref 5–9)
PROTEIN, URINE: NEGATIVE
RBC: ABNORMAL PER HPF (ref 0–5)
SP GRAVITY MISCELLANEOUS: 1.01 (ref 1–1.03)
UROBILINOGEN, URINE: 0.2
WBC: ABNORMAL PER HPF (ref 0–5)

## 2024-02-15 ENCOUNTER — HOSPITAL ENCOUNTER (OUTPATIENT)
Dept: ULTRASOUND IMAGING | Age: 64
Discharge: HOME | End: 2024-02-15
Attending: UROLOGY
Payer: MEDICARE

## 2024-02-15 DIAGNOSIS — N13.30 HYDRONEPHROSIS, UNSPECIFIED HYDRONEPHROSIS TYPE: ICD-10-CM

## 2024-02-15 PROCEDURE — 76770 US EXAM ABDO BACK WALL COMP: CPT

## 2024-02-22 ENCOUNTER — OFFICE VISIT (OUTPATIENT)
Dept: UROLOGY | Age: 64
End: 2024-02-22
Payer: MEDICARE

## 2024-02-22 VITALS — WEIGHT: 199 LBS | BODY MASS INDEX: 26.37 KG/M2 | RESPIRATION RATE: 16 BRPM | HEIGHT: 73 IN

## 2024-02-22 DIAGNOSIS — N40.1 BENIGN LOCALIZED PROSTATIC HYPERPLASIA WITH LOWER URINARY TRACT SYMPTOMS (LUTS): Primary | ICD-10-CM

## 2024-02-22 DIAGNOSIS — R31.21 ASYMPTOMATIC MICROSCOPIC HEMATURIA: ICD-10-CM

## 2024-02-22 DIAGNOSIS — N13.30 BILATERAL HYDRONEPHROSIS: ICD-10-CM

## 2024-02-22 DIAGNOSIS — R33.9 INCOMPLETE BLADDER EMPTYING: ICD-10-CM

## 2024-02-22 DIAGNOSIS — Z80.42 FAMILY HISTORY OF PROSTATE CANCER IN FATHER: ICD-10-CM

## 2024-02-22 LAB — POST VOID RESIDUAL (PVR): 33 ML

## 2024-02-22 PROCEDURE — 51798 US URINE CAPACITY MEASURE: CPT

## 2024-02-22 PROCEDURE — G8419 CALC BMI OUT NRM PARAM NOF/U: HCPCS

## 2024-02-22 PROCEDURE — 3017F COLORECTAL CA SCREEN DOC REV: CPT

## 2024-02-22 PROCEDURE — 4004F PT TOBACCO SCREEN RCVD TLK: CPT

## 2024-02-22 PROCEDURE — G8484 FLU IMMUNIZE NO ADMIN: HCPCS

## 2024-02-22 PROCEDURE — G8427 DOCREV CUR MEDS BY ELIG CLIN: HCPCS

## 2024-02-22 PROCEDURE — 1124F ACP DISCUSS-NO DSCNMKR DOCD: CPT

## 2024-02-22 PROCEDURE — 99214 OFFICE O/P EST MOD 30 MIN: CPT

## 2024-02-22 RX ORDER — TAMSULOSIN HYDROCHLORIDE 0.4 MG/1
0.4 CAPSULE ORAL DAILY
Qty: 90 CAPSULE | Refills: 3 | Status: SHIPPED | OUTPATIENT
Start: 2024-02-22

## 2024-02-22 NOTE — PATIENT INSTRUCTIONS
Practice double voiding. Empty your bladder to the best of your ability, wait ~1 minutes and squeeze out as much as you can again.

## 2024-02-22 NOTE — PROGRESS NOTES
[facility] [place] [facility]  [facility]OLOG[facility]  [address]  [address]  [facility] [address]  Dept: [phone]  Loc: [phone]  Visit Date: 2/22/2024    HPI  Suleman Lovelace is a 63 y.o. male that presents to the urology clinic for hydronephrosis follow-up.    Patient referred to our office for evaluation of WELCH and hydronephrosis in the setting of worsening renal function. Preliminary JADEN performed in August 2023 showed right-sided hydronephrosis and incomplete bladder emptying, PVR of 307 mL. Started on Flomax 0.4 mg QD by Dr. Stark and had repeat JADEN showing mild bilateral hydronephrosis, otherwise unremarkable. PVR much improved in the office today at 33 mL. Patient without significant LUTS outside of frequent urination in the morning and feelings of incomplete bladder emptying. Coffee drinker.    Family history of prostate cancer in the patient's father- diagnosed in his late 70's.     Most recent PSA: 1.26 (07/28/23)  PVR: 33 mL        I independently reviewed and verified the images and reports from:    US RENAL COMPLETE    Result Date: 2/15/2024  PROCEDURE: US RENAL COMPLETE CLINICAL INFORMATION: Hydronephrosis TECHNIQUE: Ultrasound of the kidneys and urinary bladder was performed. Grayscale and color images were obtained. COMPARISON: Renal ultrasound 8/22/2023 FINDINGS: The right kidney measures 10.4 x 4.7 x 4.9 cm and the left kidney measures 11.0 x 4.1 x 5.3 cm. Renal cortical thickness is normal bilaterally. There is mild bilateral hydronephrosis. No renal calculi are identified. Color Doppler demonstrates expected wave forms in the bilateral renal arteries. Arcuate resistive indices are normal bilaterally. The distended urinary bladder is unremarkable. There is a large amount of postvoid residual urine in the bladder with a post void volume of 202 mL.     1. Mild bilateral hydronephrosis.     2. Large amount of postvoid residual urine but otherwise unremarkable urinary

## 2024-03-05 LAB
CREAT SERPL-MCNC: 1.3 MG/DL (ref 0.8–1.4)
EGFR IF NONAFRICAN AMERICAN: 61 ML/MIN/1.73

## 2024-04-04 ENCOUNTER — HOSPITAL ENCOUNTER (OUTPATIENT)
Dept: NUCLEAR MEDICINE | Age: 64
Discharge: HOME | End: 2024-04-04
Payer: MEDICARE

## 2024-04-04 DIAGNOSIS — R31.21 ASYMPTOMATIC MICROSCOPIC HEMATURIA: ICD-10-CM

## 2024-04-04 DIAGNOSIS — R33.9 INCOMPLETE BLADDER EMPTYING: ICD-10-CM

## 2024-04-04 DIAGNOSIS — N13.30 BILATERAL HYDRONEPHROSIS: ICD-10-CM

## 2024-04-04 PROCEDURE — A9562 TC99M MERTIATIDE: HCPCS

## 2024-04-04 PROCEDURE — 78708 K FLOW/FUNCT IMAGE W/DRUG: CPT

## 2024-04-04 PROCEDURE — 3430000000 HC RX DIAGNOSTIC RADIOPHARMACEUTICAL

## 2024-04-04 PROCEDURE — 6360000002 HC RX W HCPCS: Performed by: RADIOLOGY

## 2024-04-04 RX ORDER — FUROSEMIDE 10 MG/ML
40 INJECTION INTRAMUSCULAR; INTRAVENOUS ONCE
Status: COMPLETED | OUTPATIENT
Start: 2024-04-04 | End: 2024-04-04

## 2024-04-04 RX ADMIN — Medication 10.9 MILLICURIE: at 08:20

## 2024-04-04 RX ADMIN — FUROSEMIDE 40 MG: 10 INJECTION, SOLUTION INTRAMUSCULAR; INTRAVENOUS at 08:30

## 2024-04-05 LAB
ALBUMIN SERPL-MCNC: 4.8 G/DL (ref 3.5–5.2)
ALK PHOSPHATASE: 115 U/L (ref 40–123)
ALT SERPL-CCNC: 17 U/L (ref 5–50)
ANION GAP SERPL CALCULATED.3IONS-SCNC: 9 MEQ/L (ref 7–16)
AST SERPL-CCNC: 23 U/L (ref 9–50)
BILIRUB SERPL-MCNC: 0.7 MG/DL
BUN BLDV-MCNC: 18 MG/DL (ref 8–23)
CALCIUM SERPL-MCNC: 9.9 MG/DL (ref 8.5–10.5)
CHLORIDE BLD-SCNC: 100 MEQ/L (ref 95–107)
CO2: 30 MEQ/L (ref 19–31)
CREAT SERPL-MCNC: 1.65 MG/DL (ref 0.8–1.4)
EGFR IF NONAFRICAN AMERICAN: 46 ML/MIN/1.73
GLUCOSE: 75 MG/DL (ref 70–99)
POTASSIUM SERPL-SCNC: 3.8 MEQ/L (ref 3.5–5.4)
PSA, ULTRASENSITIVE: 1.5 NG/ML
SODIUM BLD-SCNC: 139 MEQ/L (ref 133–146)
TOTAL PROTEIN: 7.1 G/DL (ref 6.1–8.3)

## 2024-04-10 ENCOUNTER — OFFICE VISIT (OUTPATIENT)
Dept: UROLOGY | Age: 64
End: 2024-04-10

## 2024-04-10 VITALS — RESPIRATION RATE: 14 BRPM | BODY MASS INDEX: 26.37 KG/M2 | HEIGHT: 73 IN | WEIGHT: 199 LBS

## 2024-04-10 DIAGNOSIS — Z80.42 FAMILY HISTORY OF PROSTATE CANCER IN FATHER: ICD-10-CM

## 2024-04-10 DIAGNOSIS — N13.30 BILATERAL HYDRONEPHROSIS: ICD-10-CM

## 2024-04-10 DIAGNOSIS — R33.9 INCOMPLETE BLADDER EMPTYING: Primary | ICD-10-CM

## 2024-04-10 DIAGNOSIS — N40.1 BENIGN LOCALIZED PROSTATIC HYPERPLASIA WITH LOWER URINARY TRACT SYMPTOMS (LUTS): ICD-10-CM

## 2024-04-10 DIAGNOSIS — R31.21 ASYMPTOMATIC MICROSCOPIC HEMATURIA: ICD-10-CM

## 2024-04-10 LAB — POST VOID RESIDUAL (PVR): 18 ML

## 2024-04-10 RX ORDER — DULOXETIN HYDROCHLORIDE 30 MG/1
30 CAPSULE, DELAYED RELEASE ORAL DAILY
COMMUNITY
Start: 2024-03-22

## 2024-04-10 NOTE — PROGRESS NOTES
[facility] [place] [facility]  [facility] UROLOGY  [address]  [address]  [facility] [address]  Dept: [phone]  Loc: [phone]  Visit Date: 4/10/2024    ELSI Lovelace is a 63 y.o. male that presents to the urology clinic for hydronephrosis follow-up.    Patient referred to our office for evaluation of WELCH and hydronephrosis in the setting of worsening renal function. Preliminary JADEN performed in August 2023 showed right-sided hydronephrosis and incomplete bladder emptying, PVR of 307 mL. Started on Flomax 0.4 mg QD by Dr. Stark and had repeat JADEN showing mild bilateral hydronephrosis, otherwise unremarkable. PVR's much improved in the office- 18 mL today. Patient without significant LUTS outside of frequent urination in the morning and feelings of incomplete bladder emptying. Coffee drinker.    Family history of prostate cancer in the patient's father- diagnosed in his late 70's.     PSA Trend  1.50   (04/04/24)  1.26   (07/28/23)    PVR: 18 mL      I independently reviewed and verified the images and reports from:    US RENAL COMPLETE    Result Date: 2/15/2024  PROCEDURE: US RENAL COMPLETE CLINICAL INFORMATION: Hydronephrosis TECHNIQUE: Ultrasound of the kidneys and urinary bladder was performed. Grayscale and color images were obtained. COMPARISON: Renal ultrasound 8/22/2023 FINDINGS: The right kidney measures 10.4 x 4.7 x 4.9 cm and the left kidney measures 11.0 x 4.1 x 5.3 cm. Renal cortical thickness is normal bilaterally. There is mild bilateral hydronephrosis. No renal calculi are identified. Color Doppler demonstrates expected wave forms in the bilateral renal arteries. Arcuate resistive indices are normal bilaterally. The distended urinary bladder is unremarkable. There is a large amount of postvoid residual urine in the bladder with a post void volume of 202 mL.     1. Mild bilateral hydronephrosis.     2. Large amount of postvoid residual urine but otherwise

## 2024-04-30 DIAGNOSIS — N18.32 STAGE 3B CHRONIC KIDNEY DISEASE (HCC): Primary | ICD-10-CM

## 2024-04-30 DIAGNOSIS — G60.9 IDIOPATHIC NEUROPATHY: ICD-10-CM

## 2024-04-30 DIAGNOSIS — N40.1 BENIGN PROSTATIC HYPERPLASIA WITH LOWER URINARY TRACT SYMPTOMS, SYMPTOM DETAILS UNSPECIFIED: ICD-10-CM

## 2024-05-03 LAB
ANION GAP SERPL CALCULATED.3IONS-SCNC: 12 MEQ/L (ref 7–16)
BUN BLDV-MCNC: 21 MG/DL (ref 8–23)
CALCIUM SERPL-MCNC: 9.7 MG/DL (ref 8.5–10.5)
CHLORIDE BLD-SCNC: 100 MEQ/L (ref 95–107)
CO2: 27 MEQ/L (ref 19–31)
CREAT SERPL-MCNC: 1.46 MG/DL (ref 0.8–1.4)
CREATINE, URINE: 229.8 MG/DL
EGFR IF NONAFRICAN AMERICAN: 53 ML/MIN/1.73
GLUCOSE: 90 MG/DL (ref 70–99)
POTASSIUM SERPL-SCNC: 3.9 MEQ/L (ref 3.5–5.4)
PROTEIN, URINE: 15 MG/DL
PROTEIN/CREAT RATIO: 65 MG/G
SODIUM BLD-SCNC: 139 MEQ/L (ref 133–146)

## 2024-05-07 ENCOUNTER — OFFICE VISIT (OUTPATIENT)
Dept: NEPHROLOGY | Age: 64
End: 2024-05-07
Payer: MEDICARE

## 2024-05-07 VITALS
SYSTOLIC BLOOD PRESSURE: 123 MMHG | BODY MASS INDEX: 25.31 KG/M2 | HEIGHT: 73 IN | WEIGHT: 191 LBS | HEART RATE: 68 BPM | OXYGEN SATURATION: 99 % | DIASTOLIC BLOOD PRESSURE: 88 MMHG

## 2024-05-07 DIAGNOSIS — N40.0 BENIGN PROSTATIC HYPERPLASIA WITHOUT LOWER URINARY TRACT SYMPTOMS: ICD-10-CM

## 2024-05-07 DIAGNOSIS — E55.9 VITAMIN D DEFICIENCY: ICD-10-CM

## 2024-05-07 DIAGNOSIS — N13.30 BILATERAL HYDRONEPHROSIS: ICD-10-CM

## 2024-05-07 DIAGNOSIS — N18.31 STAGE 3A CHRONIC KIDNEY DISEASE (HCC): Primary | ICD-10-CM

## 2024-05-07 PROCEDURE — G8427 DOCREV CUR MEDS BY ELIG CLIN: HCPCS | Performed by: INTERNAL MEDICINE

## 2024-05-07 PROCEDURE — G8419 CALC BMI OUT NRM PARAM NOF/U: HCPCS | Performed by: INTERNAL MEDICINE

## 2024-05-07 PROCEDURE — 3017F COLORECTAL CA SCREEN DOC REV: CPT | Performed by: INTERNAL MEDICINE

## 2024-05-07 PROCEDURE — 99214 OFFICE O/P EST MOD 30 MIN: CPT | Performed by: INTERNAL MEDICINE

## 2024-05-07 PROCEDURE — 1124F ACP DISCUSS-NO DSCNMKR DOCD: CPT | Performed by: INTERNAL MEDICINE

## 2024-05-07 PROCEDURE — 4004F PT TOBACCO SCREEN RCVD TLK: CPT | Performed by: INTERNAL MEDICINE

## 2024-05-07 RX ORDER — PREGABALIN 25 MG/1
25 CAPSULE ORAL DAILY
COMMUNITY
Start: 2024-04-29

## 2024-05-07 NOTE — PROGRESS NOTES
Renal Progress Note    Assessment and Plan:      Diagnosis Orders   1. Stage 3a chronic kidney disease (HCC)  Basic Metabolic Panel      2. Benign prostatic hyperplasia without lower urinary tract symptoms        3. Bilateral hydronephrosis        4. Vitamin D deficiency  Vitamin D 25 Hydroxy                PLAN:  The lab result reviewed with the patient and spouse  They understood well  I addressed their questions  Serum creatinine is improved to 1.46 mg/dL from 1.64 mg/dL.  Urine protein creatinine ratio is very good at 65 mg.  The chronic kidney disease likely due to chronic Bilateral hydronephrosis.  I discussed that with them.  Kidney ultrasound report reviewed with them as well.  He does have a bilateral hydronephrosis.  Medications reviewed  No changes  Return visit in 6 months with labs            Patient Active Problem List   Diagnosis    Acute hypoxemic respiratory failure due to COVID-19 (HCC)    Pulmonary embolism on right (HCC)    Idiopathic neuropathy    Pulmonary embolism (HCC)    Dysuria           Subjective:   Chief complaint:  Chief Complaint   Patient presents with    Follow-up     Ckd iiib      HPI:This is a follow up visit for Mr. Suleman Lovelace here today for return appointment.  I see him for chronic kidney disease.  He also has mild bilateral hydronephrosis.  He was seen initial appointment in August 2023.  He was asked to come back in 4 weeks.  Since then, he had been seen by urology Dr. Stark for the obstructive uropathy.  He also has incomplete Bladder emptying.  He is currently on tamsulosin.  He does have frequent urination  in the morning.        ROS:  Pertinent positives stated above in HPI. All other systems were reviewed and were negative.  Medications:     Current Outpatient Medications   Medication Sig Dispense Refill    pregabalin (LYRICA) 25 MG capsule Take 1 capsule by mouth daily.      DULoxetine (CYMBALTA) 30 MG extended release capsule Take 1 capsule by mouth daily

## 2024-10-23 LAB
BUN BLDV-MCNC: 16 MG/DL (ref 8–23)
CALCIUM SERPL-MCNC: 9.7 MG/DL (ref 8.6–10.5)
CHLORIDE BLD-SCNC: 104 MMOL/L (ref 96–107)
CO2: 29 MMOL/L (ref 18–32)
CREAT SERPL-MCNC: 1.5 MG/DL (ref 0.67–1.3)
EGFR IF NONAFRICAN AMERICAN: 51 ML/MIN/1.73M2
GLUCOSE: 95 MG/DL (ref 70–100)
POTASSIUM SERPL-SCNC: 5 MMOL/L (ref 3.5–5.4)
SODIUM BLD-SCNC: 142 MMOL/L (ref 135–148)
VITAMIN D 25-HYDROXY: 35.9 NG/ML (ref 30–100)

## 2024-11-05 ENCOUNTER — OFFICE VISIT (OUTPATIENT)
Dept: NEPHROLOGY | Age: 64
End: 2024-11-05

## 2024-11-05 VITALS
HEART RATE: 71 BPM | OXYGEN SATURATION: 96 % | BODY MASS INDEX: 26 KG/M2 | HEIGHT: 73 IN | SYSTOLIC BLOOD PRESSURE: 114 MMHG | DIASTOLIC BLOOD PRESSURE: 78 MMHG | WEIGHT: 196.2 LBS

## 2024-11-05 DIAGNOSIS — N13.30 BILATERAL HYDRONEPHROSIS: ICD-10-CM

## 2024-11-05 DIAGNOSIS — N18.32 STAGE 3B CHRONIC KIDNEY DISEASE (HCC): Primary | ICD-10-CM

## 2024-11-05 DIAGNOSIS — E55.9 VITAMIN D DEFICIENCY: ICD-10-CM

## 2024-11-05 RX ORDER — ATORVASTATIN CALCIUM 40 MG/1
40 TABLET, FILM COATED ORAL DAILY
COMMUNITY
Start: 2024-11-01

## 2024-11-05 RX ORDER — PREGABALIN 50 MG/1
50 CAPSULE ORAL 3 TIMES DAILY
COMMUNITY
Start: 2024-10-29

## 2024-11-05 NOTE — PROGRESS NOTES
Renal Progress Note    Assessment and Plan:      Diagnosis Orders   1. Stage 3b chronic kidney disease (HCC)        2. Bilateral hydronephrosis        3. Vitamin D deficiency                  PLAN:  Serum creatinine is stable at 1.5 mg/dl  Bilateral hydronephrosis is stable  3 .Vitamin D level is good at 5.9.   4 . Medications reviewed  5.  Will not change anything  6.  Return visit in 6 months with labs            Patient Active Problem List   Diagnosis    Acute hypoxemic respiratory failure due to COVID-19    Pulmonary embolism on right (HCC)    Idiopathic neuropathy    Pulmonary embolism (HCC)    Dysuria           Subjective:   Chief complaint:  Chief Complaint   Patient presents with    Follow-up     FU CKD 3 a      HPI:This is a follow up visit for Mr. Suleman Lovelace here today for return appointment.  I see him for chronic kidney disease among other things.  He was last seen in May 2024.  Serum creatinine was 1.46 mg/dL.  There was an improvement from 1.64 g/dL before.  Today it is 1.50 mg/dL.  He also has chronic bilateral hydronephrosis that is mild.  Doing well since last visit with no complaint.  Urinates well.  Appetite is good.  No recent hospitalizations.  No chest pain.  No shortness of breath.    ROS:  Pertinent positives stated above in HPI. All other systems were reviewed and were negative.  Medications:     Current Outpatient Medications   Medication Sig Dispense Refill    atorvastatin (LIPITOR) 40 MG tablet Take 1 tablet by mouth daily      pregabalin (LYRICA) 50 MG capsule Take 1 capsule by mouth 3 times daily.      DULoxetine (CYMBALTA) 60 MG extended release capsule Take 30 mg by mouth daily      tamsulosin (FLOMAX) 0.4 MG capsule Take 1 capsule by mouth daily 90 capsule 3    pregabalin (LYRICA) 25 MG capsule Take 1 capsule by mouth daily. (Patient not taking: Reported on 11/5/2024)      vitamin B-12 (CYANOCOBALAMIN) 1000 MCG tablet Take 1 tablet by mouth daily (Patient not taking: Reported on

## 2024-11-05 NOTE — PROGRESS NOTES
Suleman  was recently diagnosed with Hyperlipidemia and started Lipitor. His first dose was Sunday night and he had a rash and itching. His CNP instructed him to try it again Monday and he did with no reaction.

## 2025-01-25 LAB — PSA, ULTRASENSITIVE: 1.7 NG/ML (ref 0–4)

## 2025-02-03 RX ORDER — TAMSULOSIN HYDROCHLORIDE 0.4 MG/1
CAPSULE ORAL DAILY
Qty: 30 CAPSULE | Refills: 11 | Status: SHIPPED | OUTPATIENT
Start: 2025-02-03 | End: 2025-02-04 | Stop reason: SDUPTHER

## 2025-02-03 NOTE — TELEPHONE ENCOUNTER
Suleman Lovelace called requesting a refill on the following medications:  Requested Prescriptions     Pending Prescriptions Disp Refills    tamsulosin (FLOMAX) 0.4 MG capsule [Pharmacy Med Name: TAMSULOSIN HCL 0.4 MG CAPSULE] 30 capsule 11     Sig: TAKE 1 CAPSULE BY MOUTH EVERY DAY     Pharmacy verified:  [email or web address]      Date of last visit: 04/10/2024  Date of next visit (if applicable): 2/4/2025

## 2025-02-04 ENCOUNTER — OFFICE VISIT (OUTPATIENT)
Dept: UROLOGY | Age: 65
End: 2025-02-04

## 2025-02-04 VITALS — BODY MASS INDEX: 26.77 KG/M2 | RESPIRATION RATE: 20 BRPM | HEIGHT: 73 IN | WEIGHT: 202 LBS

## 2025-02-04 DIAGNOSIS — N40.1 BENIGN LOCALIZED PROSTATIC HYPERPLASIA WITH LOWER URINARY TRACT SYMPTOMS (LUTS): ICD-10-CM

## 2025-02-04 DIAGNOSIS — R31.21 ASYMPTOMATIC MICROSCOPIC HEMATURIA: ICD-10-CM

## 2025-02-04 DIAGNOSIS — N13.30 BILATERAL HYDRONEPHROSIS: ICD-10-CM

## 2025-02-04 DIAGNOSIS — Z80.42 FAMILY HISTORY OF PROSTATE CANCER IN FATHER: ICD-10-CM

## 2025-02-04 DIAGNOSIS — R33.9 INCOMPLETE BLADDER EMPTYING: Primary | ICD-10-CM

## 2025-02-04 LAB — POST VOID RESIDUAL (PVR): 57 ML

## 2025-02-04 RX ORDER — TAMSULOSIN HYDROCHLORIDE 0.4 MG/1
0.4 CAPSULE ORAL DAILY
Qty: 90 CAPSULE | Refills: 3 | Status: SHIPPED | OUTPATIENT
Start: 2025-02-04

## 2025-02-04 NOTE — PROGRESS NOTES
47% for the right kidney.  3. Prompt uptake, excretion and clearance of radiotracer from the bilateral kidneys without evidence of obstruction.     Final report electronically signed by Dr. Luis M Agustin on 4/4/2024 9:43 AM      US RENAL COMPLETE    Result Date: 2/15/2024  PROCEDURE: US RENAL COMPLETE CLINICAL INFORMATION: Hydronephrosis TECHNIQUE: Ultrasound of the kidneys and urinary bladder was performed. Grayscale and color images were obtained. COMPARISON: Renal ultrasound 8/22/2023 FINDINGS: The right kidney measures 10.4 x 4.7 x 4.9 cm and the left kidney measures 11.0 x 4.1 x 5.3 cm. Renal cortical thickness is normal bilaterally. There is mild bilateral hydronephrosis. No renal calculi are identified. Color Doppler demonstrates expected wave forms in the bilateral renal arteries. Arcuate resistive indices are normal bilaterally. The distended urinary bladder is unremarkable. There is a large amount of postvoid residual urine in the bladder with a post void volume of 202 mL.     1. Mild bilateral hydronephrosis.     2. Large amount of postvoid residual urine but otherwise unremarkable urinary bladder.     Final report electronically signed by Dr. Luis M Agustin on 2/15/2024 11:16 AM        Last BUN and creatinine:  Lab Results   Component Value Date    BUN 16 10/22/2024     Lab Results   Component Value Date    CREATININE 1.50 (H) 10/22/2024           PAST MEDICAL, FAMILY AND SOCIAL HISTORY UPDATE:  Past Medical History:   Diagnosis Date    Dysuria 08/08/2023    Hyperlipemia 10/2024    Idiopathic neuropathy 08/08/2023    Nerve damage     Screening for depression 08/08/2023     Past Surgical History:   Procedure Laterality Date    COLONOSCOPY  2016    HERNIA REPAIR      about 15 years ago     Family History   Problem Relation Age of Onset    Cancer Father         Pancreatic cancer     Outpatient Medications Marked as Taking for the 2/4/25 encounter (Office Visit) with Leon Camargo PA-C   Medication Sig

## 2025-02-17 LAB
A/G RATIO: NORMAL
ALBUMIN: 4.3 G/DL
ALP BLD-CCNC: 146 U/L
ALT SERPL-CCNC: 22 U/L
AST SERPL-CCNC: 31 U/L
BILIRUB SERPL-MCNC: 0.4 MG/DL (ref 0.1–1.4)
BILIRUBIN DIRECT: 0.2 MG/DL
BILIRUBIN, INDIRECT: NORMAL
GLOBULIN: NORMAL
TOTAL PROTEIN: 6.6 G/DL (ref 6.4–8.2)

## 2025-05-06 LAB — PSA, ULTRASENSITIVE: 1.64 NG/ML (ref 0–4)

## 2025-05-07 DIAGNOSIS — N18.32 STAGE 3B CHRONIC KIDNEY DISEASE (HCC): Primary | ICD-10-CM

## 2025-05-07 DIAGNOSIS — E55.9 VITAMIN D DEFICIENCY: ICD-10-CM

## 2025-05-14 ENCOUNTER — OFFICE VISIT (OUTPATIENT)
Dept: NEPHROLOGY | Age: 65
End: 2025-05-14
Payer: MEDICARE

## 2025-05-14 ENCOUNTER — RESULTS FOLLOW-UP (OUTPATIENT)
Dept: NEPHROLOGY | Age: 65
End: 2025-05-14

## 2025-05-14 ENCOUNTER — TELEPHONE (OUTPATIENT)
Dept: NEPHROLOGY | Age: 65
End: 2025-05-14

## 2025-05-14 VITALS
OXYGEN SATURATION: 98 % | WEIGHT: 211 LBS | BODY MASS INDEX: 27.84 KG/M2 | DIASTOLIC BLOOD PRESSURE: 68 MMHG | SYSTOLIC BLOOD PRESSURE: 114 MMHG | HEART RATE: 70 BPM

## 2025-05-14 DIAGNOSIS — N18.31 STAGE 3A CHRONIC KIDNEY DISEASE (HCC): Primary | ICD-10-CM

## 2025-05-14 LAB
25(OH)D3 SERPL-MCNC: 29 NG/ML (ref 30–100)
ANION GAP SERPL CALC-SCNC: 11 MEQ/L (ref 8–16)
BUN SERPL-MCNC: 14 MG/DL (ref 8–23)
CALCIUM SERPL-MCNC: 9.5 MG/DL (ref 8.8–10.2)
CHLORIDE SERPL-SCNC: 103 MEQ/L (ref 98–111)
CO2 SERPL-SCNC: 25 MEQ/L (ref 22–29)
CREAT SERPL-MCNC: 1.4 MG/DL (ref 0.7–1.2)
GFR SERPL CREATININE-BSD FRML MDRD: 55 ML/MIN/1.73M2
GLUCOSE SERPL-MCNC: 99 MG/DL (ref 74–109)
POTASSIUM SERPL-SCNC: 4.6 MEQ/L (ref 3.5–5.2)
SODIUM SERPL-SCNC: 139 MEQ/L (ref 135–145)

## 2025-05-14 PROCEDURE — 1159F MED LIST DOCD IN RCRD: CPT | Performed by: INTERNAL MEDICINE

## 2025-05-14 PROCEDURE — 1124F ACP DISCUSS-NO DSCNMKR DOCD: CPT | Performed by: INTERNAL MEDICINE

## 2025-05-14 PROCEDURE — G8419 CALC BMI OUT NRM PARAM NOF/U: HCPCS | Performed by: INTERNAL MEDICINE

## 2025-05-14 PROCEDURE — 99214 OFFICE O/P EST MOD 30 MIN: CPT | Performed by: INTERNAL MEDICINE

## 2025-05-14 PROCEDURE — 3017F COLORECTAL CA SCREEN DOC REV: CPT | Performed by: INTERNAL MEDICINE

## 2025-05-14 PROCEDURE — G8427 DOCREV CUR MEDS BY ELIG CLIN: HCPCS | Performed by: INTERNAL MEDICINE

## 2025-05-14 PROCEDURE — 4004F PT TOBACCO SCREEN RCVD TLK: CPT | Performed by: INTERNAL MEDICINE

## 2025-05-14 NOTE — TELEPHONE ENCOUNTER
Patient was previously seen by Dr. Chet Rosenberg. He is scheduled with Dr. Brown today. I called to see if this was a mistake on our part. Patient says that he asked to see someone else due to a language barrier. He states he had labs done last week in [facility]. I called [facility], [place], [facility] lab, and his PCP. No recent labs. Spoke with sister. I offered to  reschedule to give time for labs to be done. She says rescheduling can be difficult for them. She is going to check on labs and either come today or call back to reschedule.

## 2025-05-14 NOTE — TELEPHONE ENCOUNTER
----- Message from Dr. Belen Brown DO sent at 5/13/2025  4:48 PM EDT -----  This is Chet's patient. For some reason he is scheduled with me tomorrow

## 2025-05-14 NOTE — TELEPHONE ENCOUNTER
Patient's sister called back and they will come to the appt today, at least to meet w/ Dr. Brown and will have labs done while in office or after their visit.  Only lab drawn recently was a PSA on 5/5/25.

## 2025-05-14 NOTE — PROGRESS NOTES
[facility] [place] [facility]  [facility]  [address]  [address]  [facility] [address]  Dept: [phone]  Loc: [phone]  Progress Note  5/14/2025 9:38 AM      Pt Name:    Suleman Lovelace  YOB: 1960  Primary Care Physician:  Alicia Sawyer, CARLYN - CNP     Chief Complaint:   Chief Complaint   Patient presents with    Follow-up     CKD III        History of Present Illness:   This is a follow-up visit for CKD III. He previously followed with Dr. Rosenberg. He has switched to me due to language barrier.  First time I am seeing him.  He has a history of bladder outlet obstruction and B/L hydronephrosis.  He was started on flomax with improvement. Renal lasix scan was normal.    Accompanied by his sister.   He denies complaints.   His PVR is better with flomax.  He follows with urology.   Has hx of neuropathy and is on gabapentin and lyrica.       Pertinent items are noted in HPI.         Past History:  Past Medical History:   Diagnosis Date    Dysuria 08/08/2023    Hyperlipemia 10/2024    Idiopathic neuropathy 08/08/2023    Nerve damage     Screening for depression 08/08/2023     Past Surgical History:   Procedure Laterality Date    COLONOSCOPY  2016    HERNIA REPAIR      about 15 years ago        VITALS:  /68 (BP Site: Left Upper Arm, Patient Position: Sitting, BP Cuff Size: Large Adult)   Pulse 70   Wt 95.7 kg (211 lb)   SpO2 98%   BMI 27.84 kg/m²   Wt Readings from Last 3 Encounters:   05/14/25 95.7 kg (211 lb)   02/04/25 91.6 kg (202 lb)   11/05/24 89 kg (196 lb 3.2 oz)     Body mass index is 27.84 kg/m².     General Appearance: alert and cooperative with exam, appears comfortable, no distress  HEENT: EOMI, moist oral mucus membranes  Neck: No jugular venous distention,  Lungs: Air entry B/L, no crackles or rales, no use of accessory muscles  Heart: S1, S2 heard, no rub  GI: soft, non-tender, no guarding,   Extremities: No sig LE edema, no